# Patient Record
Sex: FEMALE | Race: WHITE | NOT HISPANIC OR LATINO | Employment: OTHER | URBAN - NONMETROPOLITAN AREA
[De-identification: names, ages, dates, MRNs, and addresses within clinical notes are randomized per-mention and may not be internally consistent; named-entity substitution may affect disease eponyms.]

---

## 2017-11-01 ENCOUNTER — TRANSCRIBE ORDERS (OUTPATIENT)
Dept: ADMINISTRATIVE | Facility: HOSPITAL | Age: 72
End: 2017-11-01

## 2017-11-01 DIAGNOSIS — Z12.31 ENCOUNTER FOR SCREENING MAMMOGRAM FOR MALIGNANT NEOPLASM OF BREAST: Primary | ICD-10-CM

## 2017-12-28 ENCOUNTER — HOSPITAL ENCOUNTER (OUTPATIENT)
Dept: MAMMOGRAPHY | Facility: HOSPITAL | Age: 72
Discharge: HOME | End: 2017-12-28
Admitting: INTERNAL MEDICINE

## 2017-12-28 DIAGNOSIS — Z12.31 ENCOUNTER FOR SCREENING MAMMOGRAM FOR MALIGNANT NEOPLASM OF BREAST: ICD-10-CM

## 2017-12-28 PROCEDURE — G0202 SCR MAMMO BI INCL CAD: HCPCS

## 2017-12-28 PROCEDURE — 77063 BREAST TOMOSYNTHESIS BI: CPT

## 2018-11-12 ENCOUNTER — TRANSCRIBE ORDERS (OUTPATIENT)
Dept: ADMINISTRATIVE | Facility: HOSPITAL | Age: 73
End: 2018-11-12

## 2018-11-12 DIAGNOSIS — Z12.39 SCREENING BREAST EXAMINATION: Primary | ICD-10-CM

## 2018-12-31 ENCOUNTER — HOSPITAL ENCOUNTER (OUTPATIENT)
Dept: MAMMOGRAPHY | Facility: HOSPITAL | Age: 73
Discharge: HOME | End: 2018-12-31

## 2018-12-31 ENCOUNTER — TRANSCRIBE ORDERS (OUTPATIENT)
Dept: ADMINISTRATIVE | Facility: HOSPITAL | Age: 73
End: 2018-12-31

## 2018-12-31 ENCOUNTER — HOSPITAL ENCOUNTER (OUTPATIENT)
Dept: MAMMOGRAPHY | Facility: HOSPITAL | Age: 73
Discharge: HOME | End: 2018-12-31
Admitting: PHYSICIAN ASSISTANT

## 2018-12-31 ENCOUNTER — HOSPITAL ENCOUNTER (OUTPATIENT)
Dept: ULTRASOUND IMAGING | Facility: HOSPITAL | Age: 73
Discharge: HOME | End: 2018-12-31

## 2018-12-31 DIAGNOSIS — Z12.39 SCREENING BREAST EXAMINATION: ICD-10-CM

## 2018-12-31 DIAGNOSIS — R92.8 ABNORMAL MAMMOGRAM: ICD-10-CM

## 2018-12-31 DIAGNOSIS — R92.8 ABNORMAL MAMMOGRAM: Primary | ICD-10-CM

## 2018-12-31 PROCEDURE — 77065 DX MAMMO INCL CAD UNI: CPT

## 2018-12-31 PROCEDURE — 77063 BREAST TOMOSYNTHESIS BI: CPT

## 2018-12-31 PROCEDURE — 77067 SCR MAMMO BI INCL CAD: CPT

## 2018-12-31 PROCEDURE — G0279 TOMOSYNTHESIS, MAMMO: HCPCS

## 2018-12-31 PROCEDURE — 76642 ULTRASOUND BREAST LIMITED: CPT

## 2019-10-31 ENCOUNTER — TRANSCRIBE ORDERS (OUTPATIENT)
Dept: ADMINISTRATIVE | Facility: HOSPITAL | Age: 74
End: 2019-10-31

## 2019-10-31 DIAGNOSIS — Z12.39 SCREENING BREAST EXAMINATION: Primary | ICD-10-CM

## 2020-01-06 ENCOUNTER — HOSPITAL ENCOUNTER (OUTPATIENT)
Dept: MAMMOGRAPHY | Facility: HOSPITAL | Age: 75
Discharge: HOME | End: 2020-01-06
Admitting: PHYSICIAN ASSISTANT

## 2020-01-06 PROCEDURE — 77067 SCR MAMMO BI INCL CAD: CPT

## 2020-01-06 PROCEDURE — 77063 BREAST TOMOSYNTHESIS BI: CPT

## 2020-11-24 ENCOUNTER — TRANSCRIBE ORDERS (OUTPATIENT)
Dept: ADMINISTRATIVE | Facility: HOSPITAL | Age: 75
End: 2020-11-24

## 2020-11-24 DIAGNOSIS — Z12.31 ENCOUNTER FOR SCREENING MAMMOGRAM FOR MALIGNANT NEOPLASM OF BREAST: Primary | ICD-10-CM

## 2021-01-01 ENCOUNTER — HOSPITAL ENCOUNTER (OUTPATIENT)
Dept: INFUSION THERAPY | Age: 76
Discharge: HOME | End: 2021-05-20
Payer: MEDICARE

## 2021-01-01 ENCOUNTER — OFFICE VISIT (OUTPATIENT)
Dept: HEMATOLOGY | Age: 76
End: 2021-01-01
Payer: MEDICARE

## 2021-01-01 ENCOUNTER — HOSPITAL ENCOUNTER (OUTPATIENT)
Dept: INFUSION THERAPY | Age: 76
Discharge: HOME | End: 2021-09-03
Payer: MEDICARE

## 2021-01-01 ENCOUNTER — HOSPITAL ENCOUNTER (OUTPATIENT)
Dept: MAMMOGRAPHY | Facility: HOSPITAL | Age: 76
Discharge: HOME | End: 2021-01-08
Admitting: PHYSICIAN ASSISTANT

## 2021-01-01 ENCOUNTER — HOSPITAL ENCOUNTER (OUTPATIENT)
Dept: INFUSION THERAPY | Age: 76
Discharge: HOME | End: 2021-09-22
Payer: MEDICARE

## 2021-01-01 ENCOUNTER — HOSPITAL ENCOUNTER (OUTPATIENT)
Dept: INFUSION THERAPY | Age: 76
Discharge: HOME | End: 2021-05-06
Payer: MEDICARE

## 2021-01-01 ENCOUNTER — HOSPITAL ENCOUNTER (OUTPATIENT)
Dept: CT IMAGING | Age: 76
Discharge: HOME | End: 2021-10-06
Payer: MEDICARE

## 2021-01-01 ENCOUNTER — HOSPITAL ENCOUNTER (OUTPATIENT)
Dept: INFUSION THERAPY | Age: 76
Discharge: HOME | End: 2021-06-09
Payer: MEDICARE

## 2021-01-01 ENCOUNTER — TELEPHONE (OUTPATIENT)
Dept: HEMATOLOGY | Age: 76
End: 2021-01-01

## 2021-01-01 ENCOUNTER — HOSPITAL ENCOUNTER (OUTPATIENT)
Dept: INFUSION THERAPY | Age: 76
Discharge: HOME | End: 2021-11-15
Payer: MEDICARE

## 2021-01-01 ENCOUNTER — HOSPITAL ENCOUNTER (OUTPATIENT)
Dept: INFUSION THERAPY | Age: 76
Discharge: HOME | End: 2021-06-07
Payer: MEDICARE

## 2021-01-01 ENCOUNTER — OFFICE VISIT (OUTPATIENT)
Dept: CARDIOLOGY | Facility: CLINIC | Age: 76
End: 2021-01-01

## 2021-01-01 ENCOUNTER — APPOINTMENT (OUTPATIENT)
Dept: CT IMAGING | Age: 76
End: 2021-01-01
Payer: MEDICARE

## 2021-01-01 ENCOUNTER — HOSPITAL ENCOUNTER (OUTPATIENT)
Dept: INFUSION THERAPY | Age: 76
Discharge: HOME | End: 2021-10-06
Payer: MEDICARE

## 2021-01-01 ENCOUNTER — HOSPITAL ENCOUNTER (OUTPATIENT)
Dept: INFUSION THERAPY | Age: 76
Discharge: HOME | End: 2021-07-07
Payer: MEDICARE

## 2021-01-01 ENCOUNTER — CLINICAL DOCUMENTATION (OUTPATIENT)
Dept: HEMATOLOGY | Age: 76
End: 2021-01-01

## 2021-01-01 ENCOUNTER — HOSPITAL ENCOUNTER (OUTPATIENT)
Dept: INFUSION THERAPY | Age: 76
Discharge: HOME | End: 2021-07-09
Payer: MEDICARE

## 2021-01-01 ENCOUNTER — HOSPITAL ENCOUNTER (OUTPATIENT)
Dept: INFUSION THERAPY | Age: 76
Discharge: HOME | End: 2021-09-24
Payer: MEDICARE

## 2021-01-01 ENCOUNTER — APPOINTMENT (OUTPATIENT)
Dept: INFUSION THERAPY | Age: 76
End: 2021-01-01
Payer: MEDICARE

## 2021-01-01 ENCOUNTER — HOSPITAL ENCOUNTER (OUTPATIENT)
Dept: INFUSION THERAPY | Age: 76
Discharge: HOME | End: 2021-10-12
Payer: MEDICARE

## 2021-01-01 ENCOUNTER — HOSPITAL ENCOUNTER (EMERGENCY)
Age: 76
Discharge: HOME | End: 2021-05-18
Payer: MEDICARE

## 2021-01-01 ENCOUNTER — HOSPITAL ENCOUNTER (OUTPATIENT)
Dept: INFUSION THERAPY | Age: 76
Discharge: HOME | End: 2021-08-06
Payer: MEDICARE

## 2021-01-01 ENCOUNTER — TRANSCRIBE ORDERS (OUTPATIENT)
Dept: ADMINISTRATIVE | Facility: HOSPITAL | Age: 76
End: 2021-01-01

## 2021-01-01 ENCOUNTER — HOSPITAL ENCOUNTER (OUTPATIENT)
Dept: INFUSION THERAPY | Age: 76
Discharge: HOME | End: 2021-08-25
Payer: MEDICARE

## 2021-01-01 ENCOUNTER — HOSPITAL ENCOUNTER (OUTPATIENT)
Dept: INFUSION THERAPY | Age: 76
Discharge: HOME | End: 2021-05-18
Payer: MEDICARE

## 2021-01-01 ENCOUNTER — HOSPITAL ENCOUNTER (OUTPATIENT)
Dept: INFUSION THERAPY | Age: 76
Discharge: HOME | End: 2021-07-28
Payer: MEDICARE

## 2021-01-01 ENCOUNTER — HOSPITAL ENCOUNTER (OUTPATIENT)
Dept: INFUSION THERAPY | Age: 76
Discharge: HOME | End: 2021-05-04
Payer: MEDICARE

## 2021-01-01 ENCOUNTER — APPOINTMENT (OUTPATIENT)
Dept: GENERAL RADIOLOGY | Age: 76
End: 2021-01-01
Payer: MEDICARE

## 2021-01-01 ENCOUNTER — HOSPITAL ENCOUNTER (OUTPATIENT)
Dept: INFUSION THERAPY | Age: 76
Discharge: HOME | End: 2021-08-04
Payer: MEDICARE

## 2021-01-01 ENCOUNTER — TELEPHONE (OUTPATIENT)
Dept: INFUSION THERAPY | Age: 76
End: 2021-01-01

## 2021-01-01 ENCOUNTER — HOSPITAL ENCOUNTER (OUTPATIENT)
Dept: INFUSION THERAPY | Age: 76
Discharge: HOME | End: 2021-09-01
Payer: MEDICARE

## 2021-01-01 ENCOUNTER — TELEPHONE (OUTPATIENT)
Dept: CARDIOLOGY | Facility: CLINIC | Age: 76
End: 2021-01-01

## 2021-01-01 ENCOUNTER — HOSPITAL ENCOUNTER (OUTPATIENT)
Dept: INFUSION THERAPY | Age: 76
Discharge: HOME | End: 2021-06-21
Payer: MEDICARE

## 2021-01-01 ENCOUNTER — HOSPITAL ENCOUNTER (OUTPATIENT)
Dept: INFUSION THERAPY | Age: 76
Discharge: HOME | End: 2021-06-29
Payer: MEDICARE

## 2021-01-01 ENCOUNTER — HOSPITAL ENCOUNTER (OUTPATIENT)
Dept: INFUSION THERAPY | Age: 76
End: 2021-01-01
Payer: MEDICARE

## 2021-01-01 VITALS
TEMPERATURE: 98.3 F | WEIGHT: 162.1 LBS | BODY MASS INDEX: 27.01 KG/M2 | SYSTOLIC BLOOD PRESSURE: 144 MMHG | HEIGHT: 65 IN | DIASTOLIC BLOOD PRESSURE: 81 MMHG | HEART RATE: 52 BPM

## 2021-01-01 VITALS
HEIGHT: 65 IN | TEMPERATURE: 97.5 F | SYSTOLIC BLOOD PRESSURE: 148 MMHG | BODY MASS INDEX: 26.02 KG/M2 | RESPIRATION RATE: 18 BRPM | DIASTOLIC BLOOD PRESSURE: 78 MMHG | OXYGEN SATURATION: 97 % | WEIGHT: 156.2 LBS | HEART RATE: 51 BPM

## 2021-01-01 VITALS
DIASTOLIC BLOOD PRESSURE: 80 MMHG | HEIGHT: 65 IN | WEIGHT: 161 LBS | OXYGEN SATURATION: 100 % | BODY MASS INDEX: 26.82 KG/M2 | HEART RATE: 74 BPM | SYSTOLIC BLOOD PRESSURE: 144 MMHG

## 2021-01-01 VITALS
HEART RATE: 62 BPM | OXYGEN SATURATION: 93 % | SYSTOLIC BLOOD PRESSURE: 136 MMHG | TEMPERATURE: 98.3 F | DIASTOLIC BLOOD PRESSURE: 78 MMHG | RESPIRATION RATE: 11 BRPM

## 2021-01-01 VITALS
WEIGHT: 163.9 LBS | SYSTOLIC BLOOD PRESSURE: 182 MMHG | BODY MASS INDEX: 27.31 KG/M2 | TEMPERATURE: 98 F | HEART RATE: 58 BPM | DIASTOLIC BLOOD PRESSURE: 88 MMHG | OXYGEN SATURATION: 98 % | HEIGHT: 65 IN

## 2021-01-01 VITALS
RESPIRATION RATE: 18 BRPM | SYSTOLIC BLOOD PRESSURE: 156 MMHG | DIASTOLIC BLOOD PRESSURE: 85 MMHG | HEART RATE: 57 BPM | HEIGHT: 65 IN | OXYGEN SATURATION: 98 % | TEMPERATURE: 98.2 F | BODY MASS INDEX: 27.11 KG/M2 | WEIGHT: 162.7 LBS

## 2021-01-01 VITALS
TEMPERATURE: 98 F | HEIGHT: 65 IN | WEIGHT: 158 LBS | BODY MASS INDEX: 26.33 KG/M2 | SYSTOLIC BLOOD PRESSURE: 146 MMHG | HEART RATE: 58 BPM | DIASTOLIC BLOOD PRESSURE: 88 MMHG | OXYGEN SATURATION: 95 %

## 2021-01-01 VITALS
WEIGHT: 155.3 LBS | HEART RATE: 53 BPM | DIASTOLIC BLOOD PRESSURE: 82 MMHG | OXYGEN SATURATION: 98 % | HEIGHT: 65 IN | RESPIRATION RATE: 18 BRPM | TEMPERATURE: 98.6 F | BODY MASS INDEX: 25.87 KG/M2 | SYSTOLIC BLOOD PRESSURE: 163 MMHG

## 2021-01-01 VITALS
BODY MASS INDEX: 26.66 KG/M2 | HEART RATE: 73 BPM | TEMPERATURE: 98.3 F | HEIGHT: 65 IN | OXYGEN SATURATION: 96 % | WEIGHT: 160 LBS | SYSTOLIC BLOOD PRESSURE: 120 MMHG | DIASTOLIC BLOOD PRESSURE: 72 MMHG

## 2021-01-01 VITALS
HEIGHT: 65 IN | OXYGEN SATURATION: 97 % | WEIGHT: 160.9 LBS | DIASTOLIC BLOOD PRESSURE: 85 MMHG | TEMPERATURE: 98.2 F | BODY MASS INDEX: 26.81 KG/M2 | RESPIRATION RATE: 18 BRPM | HEART RATE: 49 BPM | SYSTOLIC BLOOD PRESSURE: 152 MMHG

## 2021-01-01 VITALS
TEMPERATURE: 98.1 F | DIASTOLIC BLOOD PRESSURE: 80 MMHG | HEIGHT: 65 IN | HEART RATE: 51 BPM | WEIGHT: 164.1 LBS | BODY MASS INDEX: 27.34 KG/M2 | OXYGEN SATURATION: 97 % | SYSTOLIC BLOOD PRESSURE: 149 MMHG

## 2021-01-01 VITALS
SYSTOLIC BLOOD PRESSURE: 170 MMHG | WEIGHT: 159.6 LBS | BODY MASS INDEX: 26.59 KG/M2 | DIASTOLIC BLOOD PRESSURE: 92 MMHG | TEMPERATURE: 97.9 F | HEIGHT: 65 IN | HEART RATE: 56 BPM

## 2021-01-01 VITALS
OXYGEN SATURATION: 98 % | TEMPERATURE: 97.7 F | SYSTOLIC BLOOD PRESSURE: 150 MMHG | BODY MASS INDEX: 25.99 KG/M2 | DIASTOLIC BLOOD PRESSURE: 67 MMHG | HEIGHT: 65 IN | HEART RATE: 52 BPM | WEIGHT: 156 LBS

## 2021-01-01 VITALS
HEART RATE: 60 BPM | SYSTOLIC BLOOD PRESSURE: 136 MMHG | RESPIRATION RATE: 18 BRPM | OXYGEN SATURATION: 97 % | DIASTOLIC BLOOD PRESSURE: 82 MMHG

## 2021-01-01 VITALS
HEART RATE: 48 BPM | BODY MASS INDEX: 26.89 KG/M2 | TEMPERATURE: 98.5 F | HEIGHT: 65 IN | WEIGHT: 161.4 LBS | OXYGEN SATURATION: 98 % | DIASTOLIC BLOOD PRESSURE: 88 MMHG | RESPIRATION RATE: 18 BRPM | SYSTOLIC BLOOD PRESSURE: 156 MMHG

## 2021-01-01 VITALS — DIASTOLIC BLOOD PRESSURE: 86 MMHG | SYSTOLIC BLOOD PRESSURE: 142 MMHG

## 2021-01-01 VITALS
DIASTOLIC BLOOD PRESSURE: 75 MMHG | HEART RATE: 46 BPM | TEMPERATURE: 98.2 F | BODY MASS INDEX: 26.51 KG/M2 | RESPIRATION RATE: 18 BRPM | WEIGHT: 159.1 LBS | SYSTOLIC BLOOD PRESSURE: 132 MMHG | HEIGHT: 65 IN

## 2021-01-01 VITALS
HEART RATE: 49 BPM | DIASTOLIC BLOOD PRESSURE: 67 MMHG | OXYGEN SATURATION: 98 % | RESPIRATION RATE: 17 BRPM | BODY MASS INDEX: 25.73 KG/M2 | TEMPERATURE: 98.2 F | WEIGHT: 154.6 LBS | SYSTOLIC BLOOD PRESSURE: 103 MMHG

## 2021-01-01 DIAGNOSIS — C77.9 METASTATIC ADENOCARCINOMA TO LYMPH NODE (HCC): ICD-10-CM

## 2021-01-01 DIAGNOSIS — C18.9 MUCINOUS ADENOCARCINOMA OF COLON (HCC): ICD-10-CM

## 2021-01-01 DIAGNOSIS — C18.7 MALIGNANT NEOPLASM OF SIGMOID COLON (HCC): Primary | ICD-10-CM

## 2021-01-01 DIAGNOSIS — M79.89 PAIN AND SWELLING OF RIGHT LOWER LEG: ICD-10-CM

## 2021-01-01 DIAGNOSIS — I44.1 2ND DEGREE ATRIOVENTRICULAR BLOCK: Primary | ICD-10-CM

## 2021-01-01 DIAGNOSIS — Z51.11 CHEMOTHERAPY MANAGEMENT, ENCOUNTER FOR: ICD-10-CM

## 2021-01-01 DIAGNOSIS — C18.7 MALIGNANT NEOPLASM OF SIGMOID COLON (HCC): ICD-10-CM

## 2021-01-01 DIAGNOSIS — D70.1 CHEMOTHERAPY-INDUCED NEUTROPENIA (HCC): ICD-10-CM

## 2021-01-01 DIAGNOSIS — I44.1 SECOND DEGREE HEART BLOCK: Primary | ICD-10-CM

## 2021-01-01 DIAGNOSIS — T45.1X5A CHEMOTHERAPY-INDUCED PERIPHERAL NEUROPATHY (HCC): ICD-10-CM

## 2021-01-01 DIAGNOSIS — I82.431 ACUTE DEEP VEIN THROMBOSIS (DVT) OF POPLITEAL VEIN OF RIGHT LOWER EXTREMITY (HCC): ICD-10-CM

## 2021-01-01 DIAGNOSIS — Z71.89 CARE PLAN DISCUSSED WITH PATIENT: ICD-10-CM

## 2021-01-01 DIAGNOSIS — T45.1X5D ADVERSE EFFECT OF CHEMOTHERAPY, SUBSEQUENT ENCOUNTER: ICD-10-CM

## 2021-01-01 DIAGNOSIS — Z71.89 COORDINATION OF COMPLEX CARE: ICD-10-CM

## 2021-01-01 DIAGNOSIS — I82.4Y9 ACUTE DEEP VEIN THROMBOSIS (DVT) OF PROXIMAL VEIN OF LOWER EXTREMITY, UNSPECIFIED LATERALITY (HCC): Primary | ICD-10-CM

## 2021-01-01 DIAGNOSIS — T45.1X5A CHEMOTHERAPY-INDUCED NEUTROPENIA (HCC): ICD-10-CM

## 2021-01-01 DIAGNOSIS — Z12.31 ENCOUNTER FOR SCREENING MAMMOGRAM FOR MALIGNANT NEOPLASM OF BREAST: ICD-10-CM

## 2021-01-01 DIAGNOSIS — C18.9 MUCINOUS ADENOCARCINOMA OF COLON (HCC): Primary | ICD-10-CM

## 2021-01-01 DIAGNOSIS — T50.905A MEDICATION REACTION, INITIAL ENCOUNTER: Primary | ICD-10-CM

## 2021-01-01 DIAGNOSIS — R11.0 CHEMOTHERAPY-INDUCED NAUSEA: ICD-10-CM

## 2021-01-01 DIAGNOSIS — G62.0 CHEMOTHERAPY-INDUCED PERIPHERAL NEUROPATHY (HCC): ICD-10-CM

## 2021-01-01 DIAGNOSIS — M79.661 PAIN AND SWELLING OF RIGHT LOWER LEG: ICD-10-CM

## 2021-01-01 DIAGNOSIS — R11.0 CHEMOTHERAPY-INDUCED NAUSEA: Primary | ICD-10-CM

## 2021-01-01 DIAGNOSIS — T45.1X5A CHEMOTHERAPY-INDUCED NAUSEA: ICD-10-CM

## 2021-01-01 DIAGNOSIS — Z12.31 SCREENING MAMMOGRAM FOR BREAST CANCER: Primary | ICD-10-CM

## 2021-01-01 DIAGNOSIS — T45.1X5A CHEMOTHERAPY-INDUCED NAUSEA: Primary | ICD-10-CM

## 2021-01-01 DIAGNOSIS — I10 PRIMARY HYPERTENSION: ICD-10-CM

## 2021-01-01 DIAGNOSIS — E78.2 MIXED HYPERLIPIDEMIA: ICD-10-CM

## 2021-01-01 DIAGNOSIS — Z71.89 CARE PLAN DISCUSSED WITH PATIENT: Primary | ICD-10-CM

## 2021-01-01 LAB
ALBUMIN SERPL-MCNC: 3.6 G/DL (ref 3.5–5.2)
ALBUMIN SERPL-MCNC: 3.6 G/DL (ref 3.5–5.2)
ALBUMIN SERPL-MCNC: 3.7 G/DL (ref 3.5–5.2)
ALBUMIN SERPL-MCNC: 3.8 G/DL (ref 3.5–5.2)
ALBUMIN SERPL-MCNC: 3.8 G/DL (ref 3.5–5.2)
ALBUMIN SERPL-MCNC: 3.9 G/DL (ref 3.5–5.2)
ALBUMIN SERPL-MCNC: 4 G/DL (ref 3.5–5.2)
ALP BLD-CCNC: 115 U/L (ref 35–104)
ALP BLD-CCNC: 117 U/L (ref 35–104)
ALP BLD-CCNC: 119 U/L (ref 35–104)
ALP BLD-CCNC: 119 U/L (ref 35–104)
ALP BLD-CCNC: 121 U/L (ref 35–104)
ALP BLD-CCNC: 132 U/L (ref 35–104)
ALP BLD-CCNC: 133 U/L (ref 35–104)
ALP BLD-CCNC: 133 U/L (ref 35–104)
ALP BLD-CCNC: 135 U/L (ref 35–104)
ALP BLD-CCNC: 149 U/L (ref 35–104)
ALP BLD-CCNC: 178 U/L (ref 35–104)
ALT SERPL-CCNC: 15 U/L (ref 5–33)
ALT SERPL-CCNC: 17 U/L (ref 9–52)
ALT SERPL-CCNC: 18 U/L (ref 9–52)
ALT SERPL-CCNC: 21 U/L (ref 9–52)
ALT SERPL-CCNC: 22 U/L (ref 9–52)
ALT SERPL-CCNC: 23 U/L (ref 9–52)
ALT SERPL-CCNC: 24 U/L (ref 9–52)
ALT SERPL-CCNC: 25 U/L (ref 9–52)
ALT SERPL-CCNC: 27 U/L (ref 9–52)
ALT SERPL-CCNC: 28 U/L (ref 9–52)
ALT SERPL-CCNC: 33 U/L (ref 9–52)
ALT SERPL-CCNC: 53 U/L (ref 9–52)
ALT SERPL-CCNC: 63 U/L (ref 9–52)
ANION GAP SERPL CALCULATED.3IONS-SCNC: 11 MMOL/L (ref 7–19)
ANION GAP SERPL CALCULATED.3IONS-SCNC: 5 MMOL/L (ref 7–19)
ANION GAP SERPL CALCULATED.3IONS-SCNC: 6 MMOL/L (ref 7–19)
ANION GAP SERPL CALCULATED.3IONS-SCNC: 7 MMOL/L (ref 7–19)
ANION GAP SERPL CALCULATED.3IONS-SCNC: 8 MMOL/L (ref 7–19)
ANION GAP SERPL CALCULATED.3IONS-SCNC: 9 MMOL/L (ref 7–19)
AST SERPL-CCNC: 19 U/L (ref 5–32)
AST SERPL-CCNC: 25 U/L (ref 14–36)
AST SERPL-CCNC: 27 U/L (ref 14–36)
AST SERPL-CCNC: 33 U/L (ref 14–36)
AST SERPL-CCNC: 33 U/L (ref 14–36)
AST SERPL-CCNC: 35 U/L (ref 14–36)
AST SERPL-CCNC: 36 U/L (ref 14–36)
AST SERPL-CCNC: 38 U/L (ref 14–36)
AST SERPL-CCNC: 38 U/L (ref 14–36)
AST SERPL-CCNC: 43 U/L (ref 14–36)
AST SERPL-CCNC: 45 U/L (ref 14–36)
AST SERPL-CCNC: 47 U/L (ref 14–36)
AST SERPL-CCNC: 65 U/L (ref 14–36)
BASOPHILS ABSOLUTE: 0 K/UL (ref 0–0.2)
BASOPHILS ABSOLUTE: 0.01 K/UL (ref 0.01–0.08)
BASOPHILS ABSOLUTE: 0.02 K/UL (ref 0.01–0.08)
BASOPHILS ABSOLUTE: 0.03 K/UL (ref 0.01–0.08)
BASOPHILS ABSOLUTE: 0.04 K/UL (ref 0.01–0.08)
BASOPHILS ABSOLUTE: 0.04 K/UL (ref 0.01–0.08)
BASOPHILS RELATIVE PERCENT: 0 % (ref 0–1)
BASOPHILS RELATIVE PERCENT: 0.3 % (ref 0.1–1.2)
BASOPHILS RELATIVE PERCENT: 0.4 % (ref 0.1–1.2)
BASOPHILS RELATIVE PERCENT: 0.6 % (ref 0.1–1.2)
BASOPHILS RELATIVE PERCENT: 0.8 % (ref 0.1–1.2)
BASOPHILS RELATIVE PERCENT: 0.9 % (ref 0.1–1.2)
BASOPHILS RELATIVE PERCENT: 1 % (ref 0.1–1.2)
BILIRUB SERPL-MCNC: 0.3 MG/DL (ref 0.2–1.2)
BILIRUB SERPL-MCNC: 0.4 MG/DL (ref 0.2–1.3)
BILIRUB SERPL-MCNC: 0.5 MG/DL (ref 0.2–1.3)
BILIRUB SERPL-MCNC: 0.5 MG/DL (ref 0.2–1.3)
BILIRUB SERPL-MCNC: 0.6 MG/DL (ref 0.2–1.3)
BILIRUB SERPL-MCNC: 0.6 MG/DL (ref 0.2–1.3)
BILIRUB SERPL-MCNC: 0.7 MG/DL (ref 0.2–1.3)
BILIRUB SERPL-MCNC: 0.8 MG/DL (ref 0.2–1.3)
BILIRUBIN URINE: NEGATIVE
BLOOD, URINE: NEGATIVE
BUN BLDV-MCNC: 14 MG/DL (ref 8–23)
BUN BLDV-MCNC: 15 MG/DL (ref 7–17)
BUN BLDV-MCNC: 16 MG/DL (ref 7–17)
BUN BLDV-MCNC: 16 MG/DL (ref 7–17)
BUN BLDV-MCNC: 18 MG/DL (ref 7–17)
BUN BLDV-MCNC: 19 MG/DL (ref 7–17)
BUN BLDV-MCNC: 19 MG/DL (ref 7–17)
BUN BLDV-MCNC: 20 MG/DL (ref 7–17)
BUN BLDV-MCNC: 20 MG/DL (ref 7–17)
BUN BLDV-MCNC: 21 MG/DL (ref 7–17)
BUN BLDV-MCNC: 28 MG/DL (ref 7–17)
CALCIUM SERPL-MCNC: 8.4 MG/DL (ref 8.4–10.2)
CALCIUM SERPL-MCNC: 8.6 MG/DL (ref 8.4–10.2)
CALCIUM SERPL-MCNC: 8.7 MG/DL (ref 8.4–10.2)
CALCIUM SERPL-MCNC: 8.7 MG/DL (ref 8.4–10.2)
CALCIUM SERPL-MCNC: 8.8 MG/DL (ref 8.4–10.2)
CALCIUM SERPL-MCNC: 9 MG/DL (ref 8.4–10.2)
CALCIUM SERPL-MCNC: 9.1 MG/DL (ref 8.4–10.2)
CALCIUM SERPL-MCNC: 9.1 MG/DL (ref 8.4–10.2)
CALCIUM SERPL-MCNC: 9.2 MG/DL (ref 8.4–10.2)
CALCIUM SERPL-MCNC: 9.3 MG/DL (ref 8.4–10.2)
CALCIUM SERPL-MCNC: 9.3 MG/DL (ref 8.4–10.2)
CALCIUM SERPL-MCNC: 9.4 MG/DL (ref 8.4–10.2)
CALCIUM SERPL-MCNC: 9.5 MG/DL (ref 8.8–10.2)
CEA: 1.9 NG/ML (ref 0–3)
CEA: 2.5 NG/ML (ref 0–3)
CEA: 3.4 NG/ML (ref 0–3)
CHLORIDE BLD-SCNC: 101 MMOL/L (ref 98–111)
CHLORIDE BLD-SCNC: 104 MMOL/L (ref 98–111)
CHLORIDE BLD-SCNC: 105 MMOL/L (ref 98–111)
CHLORIDE BLD-SCNC: 106 MMOL/L (ref 98–111)
CHLORIDE BLD-SCNC: 107 MMOL/L (ref 98–111)
CHLORIDE BLD-SCNC: 107 MMOL/L (ref 98–111)
CLARITY: CLEAR
CO2: 25 MMOL/L (ref 22–29)
CO2: 27 MMOL/L (ref 22–29)
CO2: 27 MMOL/L (ref 22–29)
CO2: 28 MMOL/L (ref 22–29)
CO2: 28 MMOL/L (ref 22–29)
CO2: 29 MMOL/L (ref 22–29)
CO2: 30 MMOL/L (ref 22–29)
CO2: 30 MMOL/L (ref 22–29)
COLOR: YELLOW
CREAT SERPL-MCNC: 0.7 MG/DL (ref 0.5–0.9)
CREAT SERPL-MCNC: 0.7 MG/DL (ref 0.5–1)
CREAT SERPL-MCNC: 0.8 MG/DL (ref 0.5–1)
CREAT SERPL-MCNC: 1 MG/DL (ref 0.5–1)
EKG P AXIS: 68 DEGREES
EKG P-R INTERVAL: 178 MS
EKG Q-T INTERVAL: 396 MS
EKG QRS DURATION: 90 MS
EKG QTC CALCULATION (BAZETT): 403 MS
EKG T AXIS: 39 DEGREES
EOSINOPHILS ABSOLUTE: 0 K/UL (ref 0–0.6)
EOSINOPHILS ABSOLUTE: 0.06 K/UL (ref 0.04–0.54)
EOSINOPHILS ABSOLUTE: 0.07 K/UL (ref 0.04–0.54)
EOSINOPHILS ABSOLUTE: 0.08 K/UL (ref 0.04–0.54)
EOSINOPHILS ABSOLUTE: 0.09 K/UL (ref 0.04–0.54)
EOSINOPHILS ABSOLUTE: 0.1 K/UL (ref 0.04–0.54)
EOSINOPHILS ABSOLUTE: 0.14 K/UL (ref 0.04–0.54)
EOSINOPHILS ABSOLUTE: 0.19 K/UL (ref 0.04–0.54)
EOSINOPHILS ABSOLUTE: 0.23 K/UL (ref 0.04–0.54)
EOSINOPHILS ABSOLUTE: 0.24 K/UL (ref 0.04–0.54)
EOSINOPHILS ABSOLUTE: 0.35 K/UL (ref 0.04–0.54)
EOSINOPHILS RELATIVE PERCENT: 0.9 % (ref 0.7–7)
EOSINOPHILS RELATIVE PERCENT: 1 % (ref 0.7–7)
EOSINOPHILS RELATIVE PERCENT: 1 % (ref 0–5)
EOSINOPHILS RELATIVE PERCENT: 1.3 % (ref 0.7–7)
EOSINOPHILS RELATIVE PERCENT: 1.6 % (ref 0.7–7)
EOSINOPHILS RELATIVE PERCENT: 2 % (ref 0.7–7)
EOSINOPHILS RELATIVE PERCENT: 2.2 % (ref 0.7–7)
EOSINOPHILS RELATIVE PERCENT: 2.5 % (ref 0.7–7)
EOSINOPHILS RELATIVE PERCENT: 2.6 % (ref 0.7–7)
EOSINOPHILS RELATIVE PERCENT: 3.7 % (ref 0.7–7)
EOSINOPHILS RELATIVE PERCENT: 4.1 % (ref 0.7–7)
EOSINOPHILS RELATIVE PERCENT: 4.7 % (ref 0.7–7)
EOSINOPHILS RELATIVE PERCENT: 8.9 % (ref 0.7–7)
GFR AFRICAN AMERICAN: >59
GFR NON-AFRICAN AMERICAN: 54
GFR NON-AFRICAN AMERICAN: >60
GLOBULIN: 2.5 G/DL
GLOBULIN: 2.6 G/DL
GLOBULIN: 2.7 G/DL
GLOBULIN: 2.8 G/DL
GLOBULIN: 2.9 G/DL
GLOBULIN: 2.9 G/DL
GLOBULIN: 3.1 G/DL
GLUCOSE BLD-MCNC: 101 MG/DL (ref 74–106)
GLUCOSE BLD-MCNC: 102 MG/DL (ref 74–106)
GLUCOSE BLD-MCNC: 103 MG/DL (ref 74–109)
GLUCOSE BLD-MCNC: 68 MG/DL (ref 74–106)
GLUCOSE BLD-MCNC: 78 MG/DL (ref 74–106)
GLUCOSE BLD-MCNC: 81 MG/DL (ref 74–106)
GLUCOSE BLD-MCNC: 84 MG/DL (ref 74–106)
GLUCOSE BLD-MCNC: 94 MG/DL (ref 74–106)
GLUCOSE BLD-MCNC: 96 MG/DL (ref 74–106)
GLUCOSE BLD-MCNC: 96 MG/DL (ref 74–106)
GLUCOSE BLD-MCNC: 97 MG/DL (ref 74–106)
GLUCOSE BLD-MCNC: 98 MG/DL (ref 74–106)
GLUCOSE BLD-MCNC: 98 MG/DL (ref 74–106)
GLUCOSE URINE: NEGATIVE MG/DL
HCT VFR BLD CALC: 33.7 % (ref 34.1–44.9)
HCT VFR BLD CALC: 35.1 % (ref 34.1–44.9)
HCT VFR BLD CALC: 35.5 % (ref 34.1–44.9)
HCT VFR BLD CALC: 35.8 % (ref 34.1–44.9)
HCT VFR BLD CALC: 36.1 % (ref 34.1–44.9)
HCT VFR BLD CALC: 36.3 % (ref 34.1–44.9)
HCT VFR BLD CALC: 36.3 % (ref 34.1–44.9)
HCT VFR BLD CALC: 36.4 % (ref 34.1–44.9)
HCT VFR BLD CALC: 36.6 % (ref 34.1–44.9)
HCT VFR BLD CALC: 37 % (ref 34.1–44.9)
HCT VFR BLD CALC: 37.2 % (ref 34.1–44.9)
HCT VFR BLD CALC: 38.3 % (ref 34.1–44.9)
HCT VFR BLD CALC: 39.4 % (ref 37–47)
HEMOGLOBIN: 11.4 G/DL (ref 11.2–15.7)
HEMOGLOBIN: 11.7 G/DL (ref 11.2–15.7)
HEMOGLOBIN: 11.9 G/DL (ref 11.2–15.7)
HEMOGLOBIN: 11.9 G/DL (ref 11.2–15.7)
HEMOGLOBIN: 12 G/DL (ref 11.2–15.7)
HEMOGLOBIN: 12.1 G/DL (ref 11.2–15.7)
HEMOGLOBIN: 12.3 G/DL (ref 11.2–15.7)
HEMOGLOBIN: 12.5 G/DL (ref 11.2–15.7)
HEMOGLOBIN: 13.4 G/DL (ref 12–16)
IMMATURE GRANULOCYTES #: 0 K/UL
KETONES, URINE: NEGATIVE MG/DL
LEUKOCYTE ESTERASE, URINE: NEGATIVE
LYMPHOCYTES ABSOLUTE: 1 K/UL (ref 1.1–4.5)
LYMPHOCYTES ABSOLUTE: 1.26 K/UL (ref 1.18–3.74)
LYMPHOCYTES ABSOLUTE: 1.44 K/UL (ref 1.18–3.74)
LYMPHOCYTES ABSOLUTE: 1.51 K/UL (ref 1.18–3.74)
LYMPHOCYTES ABSOLUTE: 1.58 K/UL (ref 1.18–3.74)
LYMPHOCYTES ABSOLUTE: 1.6 K/UL (ref 1.18–3.74)
LYMPHOCYTES ABSOLUTE: 1.62 K/UL (ref 1.18–3.74)
LYMPHOCYTES ABSOLUTE: 1.64 K/UL (ref 1.18–3.74)
LYMPHOCYTES ABSOLUTE: 1.78 K/UL (ref 1.18–3.74)
LYMPHOCYTES ABSOLUTE: 1.86 K/UL (ref 1.18–3.74)
LYMPHOCYTES ABSOLUTE: 1.94 K/UL (ref 1.18–3.74)
LYMPHOCYTES ABSOLUTE: 1.99 K/UL (ref 1.18–3.74)
LYMPHOCYTES ABSOLUTE: 2.15 K/UL (ref 1.18–3.74)
LYMPHOCYTES RELATIVE PERCENT: 17.7 % (ref 19.3–53.1)
LYMPHOCYTES RELATIVE PERCENT: 19.8 % (ref 19.3–53.1)
LYMPHOCYTES RELATIVE PERCENT: 22 % (ref 19.3–53.1)
LYMPHOCYTES RELATIVE PERCENT: 23.6 % (ref 19.3–53.1)
LYMPHOCYTES RELATIVE PERCENT: 24.6 % (ref 20–40)
LYMPHOCYTES RELATIVE PERCENT: 30.4 % (ref 19.3–53.1)
LYMPHOCYTES RELATIVE PERCENT: 31 % (ref 19.3–53.1)
LYMPHOCYTES RELATIVE PERCENT: 31.9 % (ref 19.3–53.1)
LYMPHOCYTES RELATIVE PERCENT: 42.8 % (ref 19.3–53.1)
LYMPHOCYTES RELATIVE PERCENT: 46.7 % (ref 19.3–53.1)
LYMPHOCYTES RELATIVE PERCENT: 48.4 % (ref 19.3–53.1)
LYMPHOCYTES RELATIVE PERCENT: 49.1 % (ref 19.3–53.1)
LYMPHOCYTES RELATIVE PERCENT: 51.7 % (ref 19.3–53.1)
MCH RBC QN AUTO: 31.3 PG (ref 25.6–32.2)
MCH RBC QN AUTO: 31.3 PG (ref 25.6–32.2)
MCH RBC QN AUTO: 31.6 PG (ref 25.6–32.2)
MCH RBC QN AUTO: 31.6 PG (ref 25.6–32.2)
MCH RBC QN AUTO: 31.8 PG (ref 25.6–32.2)
MCH RBC QN AUTO: 31.9 PG (ref 25.6–32.2)
MCH RBC QN AUTO: 31.9 PG (ref 27–31)
MCH RBC QN AUTO: 32 PG (ref 25.6–32.2)
MCH RBC QN AUTO: 32.2 PG (ref 25.6–32.2)
MCH RBC QN AUTO: 32.4 PG (ref 25.6–32.2)
MCH RBC QN AUTO: 32.7 PG (ref 25.6–32.2)
MCH RBC QN AUTO: 33.1 PG (ref 25.6–32.2)
MCH RBC QN AUTO: 33.5 PG (ref 25.6–32.2)
MCHC RBC AUTO-ENTMCNC: 32.6 G/DL (ref 32.3–35.5)
MCHC RBC AUTO-ENTMCNC: 33.1 G/DL (ref 32.3–35.5)
MCHC RBC AUTO-ENTMCNC: 33.2 G/DL (ref 32.3–35.5)
MCHC RBC AUTO-ENTMCNC: 33.3 G/DL (ref 32.3–35.5)
MCHC RBC AUTO-ENTMCNC: 33.3 G/DL (ref 32.3–35.5)
MCHC RBC AUTO-ENTMCNC: 33.5 G/DL (ref 32.3–35.5)
MCHC RBC AUTO-ENTMCNC: 33.5 G/DL (ref 32.3–35.5)
MCHC RBC AUTO-ENTMCNC: 33.6 G/DL (ref 32.3–35.5)
MCHC RBC AUTO-ENTMCNC: 33.8 G/DL (ref 32.3–35.5)
MCHC RBC AUTO-ENTMCNC: 34 G/DL (ref 33–37)
MCHC RBC AUTO-ENTMCNC: 34.2 G/DL (ref 32.3–35.5)
MCV RBC AUTO: 100 FL (ref 79.4–94.8)
MCV RBC AUTO: 93.4 FL (ref 79.4–94.8)
MCV RBC AUTO: 93.6 FL (ref 79.4–94.8)
MCV RBC AUTO: 93.8 FL (ref 81–99)
MCV RBC AUTO: 94.8 FL (ref 79.4–94.8)
MCV RBC AUTO: 95 FL (ref 79.4–94.8)
MCV RBC AUTO: 95 FL (ref 79.4–94.8)
MCV RBC AUTO: 95.8 FL (ref 79.4–94.8)
MCV RBC AUTO: 95.9 FL (ref 79.4–94.8)
MCV RBC AUTO: 96.8 FL (ref 79.4–94.8)
MCV RBC AUTO: 97.3 FL (ref 79.4–94.8)
MCV RBC AUTO: 97.4 FL (ref 79.4–94.8)
MCV RBC AUTO: 99.4 FL (ref 79.4–94.8)
MONOCYTES ABSOLUTE: 0.2 K/UL (ref 0–0.9)
MONOCYTES ABSOLUTE: 0.37 K/UL (ref 0.24–0.82)
MONOCYTES ABSOLUTE: 0.51 K/UL (ref 0.24–0.82)
MONOCYTES ABSOLUTE: 0.55 K/UL (ref 0.24–0.82)
MONOCYTES ABSOLUTE: 0.71 K/UL (ref 0.24–0.82)
MONOCYTES ABSOLUTE: 0.76 K/UL (ref 0.24–0.82)
MONOCYTES ABSOLUTE: 0.77 K/UL (ref 0.24–0.82)
MONOCYTES ABSOLUTE: 0.78 K/UL (ref 0.24–0.82)
MONOCYTES ABSOLUTE: 0.81 K/UL (ref 0.24–0.82)
MONOCYTES ABSOLUTE: 0.86 K/UL (ref 0.24–0.82)
MONOCYTES ABSOLUTE: 0.87 K/UL (ref 0.24–0.82)
MONOCYTES ABSOLUTE: 0.9 K/UL (ref 0.24–0.82)
MONOCYTES ABSOLUTE: 0.91 K/UL (ref 0.24–0.82)
MONOCYTES RELATIVE PERCENT: 10.1 % (ref 4.7–12.5)
MONOCYTES RELATIVE PERCENT: 10.2 % (ref 4.7–12.5)
MONOCYTES RELATIVE PERCENT: 11 % (ref 4.7–12.5)
MONOCYTES RELATIVE PERCENT: 11.4 % (ref 4.7–12.5)
MONOCYTES RELATIVE PERCENT: 11.9 % (ref 4.7–12.5)
MONOCYTES RELATIVE PERCENT: 13.7 % (ref 4.7–12.5)
MONOCYTES RELATIVE PERCENT: 20.9 % (ref 4.7–12.5)
MONOCYTES RELATIVE PERCENT: 24.2 % (ref 4.7–12.5)
MONOCYTES RELATIVE PERCENT: 25.2 % (ref 4.7–12.5)
MONOCYTES RELATIVE PERCENT: 28.8 % (ref 4.7–12.5)
MONOCYTES RELATIVE PERCENT: 4.2 % (ref 0–10)
MONOCYTES RELATIVE PERCENT: 7.5 % (ref 4.7–12.5)
MONOCYTES RELATIVE PERCENT: 7.7 % (ref 4.7–12.5)
NEUTROPHILS ABSOLUTE: 0.6 K/UL (ref 1.56–6.13)
NEUTROPHILS ABSOLUTE: 0.73 K/UL (ref 1.56–6.13)
NEUTROPHILS ABSOLUTE: 0.76 K/UL (ref 1.56–6.13)
NEUTROPHILS ABSOLUTE: 0.82 K/UL (ref 1.56–6.13)
NEUTROPHILS ABSOLUTE: 1.19 K/UL (ref 1.56–6.13)
NEUTROPHILS ABSOLUTE: 2.49 K/UL (ref 1.56–6.13)
NEUTROPHILS ABSOLUTE: 2.8 K/UL (ref 1.5–7.5)
NEUTROPHILS ABSOLUTE: 3.56 K/UL (ref 1.56–6.13)
NEUTROPHILS ABSOLUTE: 3.74 K/UL (ref 1.56–6.13)
NEUTROPHILS ABSOLUTE: 4.5 K/UL (ref 1.56–6.13)
NEUTROPHILS ABSOLUTE: 4.83 K/UL (ref 1.56–6.13)
NEUTROPHILS ABSOLUTE: 6.19 K/UL (ref 1.56–6.13)
NEUTROPHILS ABSOLUTE: 8.11 K/UL (ref 1.56–6.13)
NEUTROPHILS RELATIVE PERCENT: 19.2 % (ref 34–71.1)
NEUTROPHILS RELATIVE PERCENT: 21 % (ref 34–71.1)
NEUTROPHILS RELATIVE PERCENT: 22.6 % (ref 34–71.1)
NEUTROPHILS RELATIVE PERCENT: 30.3 % (ref 34–71.1)
NEUTROPHILS RELATIVE PERCENT: 31.8 % (ref 34–71.1)
NEUTROPHILS RELATIVE PERCENT: 53.5 % (ref 34–71.1)
NEUTROPHILS RELATIVE PERCENT: 55.5 % (ref 34–71.1)
NEUTROPHILS RELATIVE PERCENT: 55.8 % (ref 34–71.1)
NEUTROPHILS RELATIVE PERCENT: 64.6 % (ref 34–71.1)
NEUTROPHILS RELATIVE PERCENT: 65.5 % (ref 34–71.1)
NEUTROPHILS RELATIVE PERCENT: 68.7 % (ref 34–71.1)
NEUTROPHILS RELATIVE PERCENT: 70.2 % (ref 50–65)
NEUTROPHILS RELATIVE PERCENT: 72.2 % (ref 34–71.1)
NITRITE, URINE: NEGATIVE
PDW BLD-RTO: 13.5 % (ref 11.7–14.4)
PDW BLD-RTO: 13.7 % (ref 11.5–14.5)
PDW BLD-RTO: 14.1 % (ref 11.7–14.4)
PDW BLD-RTO: 14.1 % (ref 11.7–14.4)
PDW BLD-RTO: 14.7 % (ref 11.7–14.4)
PDW BLD-RTO: 15 % (ref 11.7–14.4)
PDW BLD-RTO: 15.2 % (ref 11.7–14.4)
PDW BLD-RTO: 15.2 % (ref 11.7–14.4)
PDW BLD-RTO: 15.3 % (ref 11.7–14.4)
PDW BLD-RTO: 15.6 % (ref 11.7–14.4)
PDW BLD-RTO: 15.6 % (ref 11.7–14.4)
PDW BLD-RTO: 15.9 % (ref 11.7–14.4)
PDW BLD-RTO: 16.1 % (ref 11.7–14.4)
PH UA: 6 (ref 5–8)
PLATELET # BLD: 102 K/UL (ref 182–369)
PLATELET # BLD: 148 K/UL (ref 182–369)
PLATELET # BLD: 175 K/UL (ref 182–369)
PLATELET # BLD: 176 K/UL (ref 130–400)
PLATELET # BLD: 197 K/UL (ref 182–369)
PLATELET # BLD: 203 K/UL (ref 182–369)
PLATELET # BLD: 210 K/UL (ref 182–369)
PLATELET # BLD: 228 K/UL (ref 182–369)
PLATELET # BLD: 237 K/UL (ref 182–369)
PLATELET # BLD: 239 K/UL (ref 182–369)
PLATELET # BLD: 243 K/UL (ref 182–369)
PLATELET # BLD: 257 K/UL (ref 182–369)
PLATELET # BLD: 259 K/UL (ref 182–369)
PMV BLD AUTO: 10.2 FL (ref 7.4–10.4)
PMV BLD AUTO: 10.3 FL (ref 7.4–10.4)
PMV BLD AUTO: 10.3 FL (ref 9.4–12.3)
PMV BLD AUTO: 10.4 FL (ref 7.4–10.4)
PMV BLD AUTO: 10.5 FL (ref 7.4–10.4)
PMV BLD AUTO: 10.7 FL (ref 7.4–10.4)
PMV BLD AUTO: 9.4 FL (ref 7.4–10.4)
PMV BLD AUTO: 9.4 FL (ref 7.4–10.4)
PMV BLD AUTO: 9.6 FL (ref 7.4–10.4)
PMV BLD AUTO: 9.6 FL (ref 7.4–10.4)
PMV BLD AUTO: 9.7 FL (ref 7.4–10.4)
PMV BLD AUTO: 9.8 FL (ref 7.4–10.4)
PMV BLD AUTO: 9.8 FL (ref 7.4–10.4)
POTASSIUM REFLEX MAGNESIUM: 3.6 MMOL/L (ref 3.5–5)
POTASSIUM SERPL-SCNC: 4 MMOL/L (ref 3.5–5.1)
POTASSIUM SERPL-SCNC: 4.3 MMOL/L (ref 3.5–5.1)
POTASSIUM SERPL-SCNC: 4.4 MMOL/L (ref 3.5–5.1)
POTASSIUM SERPL-SCNC: 4.5 MMOL/L (ref 3.5–5.1)
POTASSIUM SERPL-SCNC: 4.5 MMOL/L (ref 3.5–5.1)
POTASSIUM SERPL-SCNC: 4.8 MMOL/L (ref 3.5–5.1)
PROTEIN UA: NEGATIVE
PROTEIN UA: NEGATIVE MG/DL
RBC # BLD: 3.53 M/UL (ref 3.93–5.22)
RBC # BLD: 3.55 M/UL (ref 3.93–5.22)
RBC # BLD: 3.61 M/UL (ref 3.93–5.22)
RBC # BLD: 3.7 M/UL (ref 3.93–5.22)
RBC # BLD: 3.73 M/UL (ref 3.93–5.22)
RBC # BLD: 3.8 M/UL (ref 3.93–5.22)
RBC # BLD: 3.82 M/UL (ref 3.93–5.22)
RBC # BLD: 3.83 M/UL (ref 3.93–5.22)
RBC # BLD: 3.83 M/UL (ref 3.93–5.22)
RBC # BLD: 3.86 M/UL (ref 3.93–5.22)
RBC # BLD: 3.91 M/UL (ref 3.93–5.22)
RBC # BLD: 4 M/UL (ref 3.93–5.22)
RBC # BLD: 4.2 M/UL (ref 4.2–5.4)
SODIUM BLD-SCNC: 137 MMOL/L (ref 137–145)
SODIUM BLD-SCNC: 138 MMOL/L (ref 136–145)
SODIUM BLD-SCNC: 138 MMOL/L (ref 137–145)
SODIUM BLD-SCNC: 139 MMOL/L (ref 137–145)
SODIUM BLD-SCNC: 140 MMOL/L (ref 137–145)
SODIUM BLD-SCNC: 141 MMOL/L (ref 137–145)
SODIUM BLD-SCNC: 141 MMOL/L (ref 137–145)
SODIUM BLD-SCNC: 142 MMOL/L (ref 137–145)
SODIUM BLD-SCNC: 144 MMOL/L (ref 137–145)
SPECIFIC GRAVITY UA: 1.01 (ref 1–1.03)
TOTAL PROTEIN: 6.2 G/DL (ref 6.3–8.2)
TOTAL PROTEIN: 6.3 G/DL (ref 6.3–8.2)
TOTAL PROTEIN: 6.3 G/DL (ref 6.3–8.2)
TOTAL PROTEIN: 6.4 G/DL (ref 6.3–8.2)
TOTAL PROTEIN: 6.5 G/DL (ref 6.3–8.2)
TOTAL PROTEIN: 6.7 G/DL (ref 6.3–8.2)
TOTAL PROTEIN: 6.8 G/DL (ref 6.3–8.2)
TOTAL PROTEIN: 6.9 G/DL (ref 6.3–8.2)
TOTAL PROTEIN: 7 G/DL (ref 6.6–8.7)
UROBILINOGEN, URINE: 0.2 E.U./DL
WBC # BLD: 11.23 K/UL (ref 3.98–10.04)
WBC # BLD: 2.7 K/UL (ref 3.98–10.04)
WBC # BLD: 3.12 K/UL (ref 3.98–10.04)
WBC # BLD: 3.22 K/UL (ref 3.98–10.04)
WBC # BLD: 3.6 K/UL (ref 3.98–10.04)
WBC # BLD: 3.74 K/UL (ref 3.98–10.04)
WBC # BLD: 4 K/UL (ref 4.8–10.8)
WBC # BLD: 4.65 K/UL (ref 3.98–10.04)
WBC # BLD: 6.38 K/UL (ref 3.98–10.04)
WBC # BLD: 6.74 K/UL (ref 3.98–10.04)
WBC # BLD: 6.96 K/UL (ref 3.98–10.04)
WBC # BLD: 7.37 K/UL (ref 3.98–10.04)
WBC # BLD: 9.01 K/UL (ref 3.98–10.04)

## 2021-01-01 PROCEDURE — 96375 TX/PRO/DX INJ NEW DRUG ADDON: CPT

## 2021-01-01 PROCEDURE — 96413 CHEMO IV INFUSION 1 HR: CPT

## 2021-01-01 PROCEDURE — 96523 IRRIG DRUG DELIVERY DEVICE: CPT

## 2021-01-01 PROCEDURE — 6360000002 HC RX W HCPCS: Performed by: INTERNAL MEDICINE

## 2021-01-01 PROCEDURE — 96417 CHEMO IV INFUS EACH ADDL SEQ: CPT

## 2021-01-01 PROCEDURE — G8484 FLU IMMUNIZE NO ADMIN: HCPCS | Performed by: INTERNAL MEDICINE

## 2021-01-01 PROCEDURE — G8428 CUR MEDS NOT DOCUMENT: HCPCS | Performed by: INTERNAL MEDICINE

## 2021-01-01 PROCEDURE — 99215 OFFICE O/P EST HI 40 MIN: CPT | Performed by: INTERNAL MEDICINE

## 2021-01-01 PROCEDURE — 6370000000 HC RX 637 (ALT 250 FOR IP): Performed by: INTERNAL MEDICINE

## 2021-01-01 PROCEDURE — 96368 THER/DIAG CONCURRENT INF: CPT

## 2021-01-01 PROCEDURE — 1090F PRES/ABSN URINE INCON ASSESS: CPT | Performed by: INTERNAL MEDICINE

## 2021-01-01 PROCEDURE — 85025 COMPLETE CBC W/AUTO DIFF WBC: CPT

## 2021-01-01 PROCEDURE — G8427 DOCREV CUR MEDS BY ELIG CLIN: HCPCS | Performed by: INTERNAL MEDICINE

## 2021-01-01 PROCEDURE — 82378 CARCINOEMBRYONIC ANTIGEN: CPT

## 2021-01-01 PROCEDURE — 36415 COLL VENOUS BLD VENIPUNCTURE: CPT

## 2021-01-01 PROCEDURE — 99214 OFFICE O/P EST MOD 30 MIN: CPT | Performed by: INTERNAL MEDICINE

## 2021-01-01 PROCEDURE — G0498 CHEMO EXTEND IV INFUS W/PUMP: HCPCS

## 2021-01-01 PROCEDURE — 4040F PNEUMOC VAC/ADMIN/RCVD: CPT | Performed by: INTERNAL MEDICINE

## 2021-01-01 PROCEDURE — 80053 COMPREHEN METABOLIC PANEL: CPT

## 2021-01-01 PROCEDURE — 96411 CHEMO IV PUSH ADDL DRUG: CPT

## 2021-01-01 PROCEDURE — 1123F ACP DISCUSS/DSCN MKR DOCD: CPT | Performed by: INTERNAL MEDICINE

## 2021-01-01 PROCEDURE — 96415 CHEMO IV INFUSION ADDL HR: CPT

## 2021-01-01 PROCEDURE — 96374 THER/PROPH/DIAG INJ IV PUSH: CPT

## 2021-01-01 PROCEDURE — G8400 PT W/DXA NO RESULTS DOC: HCPCS | Performed by: INTERNAL MEDICINE

## 2021-01-01 PROCEDURE — G8417 CALC BMI ABV UP PARAM F/U: HCPCS | Performed by: INTERNAL MEDICINE

## 2021-01-01 PROCEDURE — 3017F COLORECTAL CA SCREEN DOC REV: CPT | Performed by: INTERNAL MEDICINE

## 2021-01-01 PROCEDURE — 1036F TOBACCO NON-USER: CPT | Performed by: INTERNAL MEDICINE

## 2021-01-01 PROCEDURE — 93005 ELECTROCARDIOGRAM TRACING: CPT | Performed by: PHYSICIAN ASSISTANT

## 2021-01-01 PROCEDURE — 6360000002 HC RX W HCPCS

## 2021-01-01 PROCEDURE — 2580000003 HC RX 258: Performed by: INTERNAL MEDICINE

## 2021-01-01 PROCEDURE — 6360000002 HC RX W HCPCS: Performed by: PHYSICIAN ASSISTANT

## 2021-01-01 PROCEDURE — 81003 URINALYSIS AUTO W/O SCOPE: CPT

## 2021-01-01 PROCEDURE — 77063 BREAST TOMOSYNTHESIS BI: CPT

## 2021-01-01 PROCEDURE — 96367 TX/PROPH/DG ADDL SEQ IV INF: CPT

## 2021-01-01 PROCEDURE — 71260 CT THORAX DX C+: CPT

## 2021-01-01 PROCEDURE — 96377 APPLICATON ON-BODY INJECTOR: CPT

## 2021-01-01 PROCEDURE — 96409 CHEMO IV PUSH SNGL DRUG: CPT

## 2021-01-01 PROCEDURE — 93000 ELECTROCARDIOGRAM COMPLETE: CPT | Performed by: INTERNAL MEDICINE

## 2021-01-01 PROCEDURE — 99204 OFFICE O/P NEW MOD 45 MIN: CPT | Performed by: INTERNAL MEDICINE

## 2021-01-01 PROCEDURE — 96401 CHEMO ANTI-NEOPL SQ/IM: CPT

## 2021-01-01 PROCEDURE — 99205 OFFICE O/P NEW HI 60 MIN: CPT | Performed by: INTERNAL MEDICINE

## 2021-01-01 PROCEDURE — 99212 OFFICE O/P EST SF 10 MIN: CPT

## 2021-01-01 PROCEDURE — 36415 COLL VENOUS BLD VENIPUNCTURE: CPT | Performed by: INTERNAL MEDICINE

## 2021-01-01 PROCEDURE — 36591 DRAW BLOOD OFF VENOUS DEVICE: CPT

## 2021-01-01 PROCEDURE — 99213 OFFICE O/P EST LOW 20 MIN: CPT | Performed by: INTERNAL MEDICINE

## 2021-01-01 PROCEDURE — 71045 X-RAY EXAM CHEST 1 VIEW: CPT

## 2021-01-01 PROCEDURE — 77067 SCR MAMMO BI INCL CAD: CPT

## 2021-01-01 PROCEDURE — 6360000004 HC RX CONTRAST MEDICATION: Performed by: INTERNAL MEDICINE

## 2021-01-01 PROCEDURE — 74177 CT ABD & PELVIS W/CONTRAST: CPT

## 2021-01-01 PROCEDURE — 70450 CT HEAD/BRAIN W/O DYE: CPT

## 2021-01-01 RX ORDER — FLUOROURACIL 50 MG/ML
400 INJECTION, SOLUTION INTRAVENOUS ONCE
Status: COMPLETED | OUTPATIENT
Start: 2021-01-01 | End: 2021-01-01

## 2021-01-01 RX ORDER — HEPARIN SODIUM (PORCINE) LOCK FLUSH IV SOLN 100 UNIT/ML 100 UNIT/ML
500 SOLUTION INTRAVENOUS PRN
Status: DISCONTINUED | OUTPATIENT
Start: 2021-01-01 | End: 2021-01-01 | Stop reason: HOSPADM

## 2021-01-01 RX ORDER — SODIUM CHLORIDE 0.9 % (FLUSH) 0.9 %
5-40 SYRINGE (ML) INJECTION PRN
Status: CANCELLED | OUTPATIENT
Start: 2021-01-01

## 2021-01-01 RX ORDER — METHYLPREDNISOLONE SODIUM SUCCINATE 125 MG/2ML
125 INJECTION, POWDER, LYOPHILIZED, FOR SOLUTION INTRAMUSCULAR; INTRAVENOUS ONCE
Status: CANCELLED | OUTPATIENT
Start: 2021-01-01 | End: 2021-01-01

## 2021-01-01 RX ORDER — HEPARIN SODIUM (PORCINE) LOCK FLUSH IV SOLN 100 UNIT/ML 100 UNIT/ML
500 SOLUTION INTRAVENOUS PRN
Status: CANCELLED | OUTPATIENT
Start: 2021-01-01

## 2021-01-01 RX ORDER — OMEPRAZOLE 20 MG/1
CAPSULE, DELAYED RELEASE ORAL DAILY
COMMUNITY

## 2021-01-01 RX ORDER — CLONIDINE HYDROCHLORIDE 0.1 MG/1
0.1 TABLET ORAL ONCE
Status: COMPLETED | OUTPATIENT
Start: 2021-01-01 | End: 2021-01-01

## 2021-01-01 RX ORDER — SODIUM CHLORIDE 0.9 % (FLUSH) 0.9 %
5-40 SYRINGE (ML) INJECTION PRN
Status: DISCONTINUED | OUTPATIENT
Start: 2021-01-01 | End: 2021-01-01 | Stop reason: HOSPADM

## 2021-01-01 RX ORDER — DIPHENHYDRAMINE HYDROCHLORIDE 50 MG/ML
50 INJECTION INTRAMUSCULAR; INTRAVENOUS ONCE
Status: CANCELLED | OUTPATIENT
Start: 2021-01-01 | End: 2021-01-01

## 2021-01-01 RX ORDER — EPINEPHRINE 1 MG/ML
0.3 INJECTION, SOLUTION, CONCENTRATE INTRAVENOUS PRN
Status: CANCELLED | OUTPATIENT
Start: 2021-01-01

## 2021-01-01 RX ORDER — DILTIAZEM HYDROCHLORIDE 120 MG/1
120 CAPSULE, COATED, EXTENDED RELEASE ORAL DAILY
COMMUNITY

## 2021-01-01 RX ORDER — ONDANSETRON 4 MG/1
4 TABLET, FILM COATED ORAL EVERY 6 HOURS PRN
Qty: 30 TABLET | Refills: 5 | Status: SHIPPED | OUTPATIENT
Start: 2021-01-01

## 2021-01-01 RX ORDER — PALONOSETRON 0.05 MG/ML
0.25 INJECTION, SOLUTION INTRAVENOUS ONCE
Status: COMPLETED | OUTPATIENT
Start: 2021-01-01 | End: 2021-01-01

## 2021-01-01 RX ORDER — SODIUM CHLORIDE 9 MG/ML
INJECTION, SOLUTION INTRAVENOUS CONTINUOUS
Status: CANCELLED | OUTPATIENT
Start: 2021-01-01

## 2021-01-01 RX ORDER — DEXTROSE MONOHYDRATE 50 MG/ML
INJECTION, SOLUTION INTRAVENOUS ONCE
Status: CANCELLED | OUTPATIENT
Start: 2021-01-01 | End: 2021-01-01

## 2021-01-01 RX ORDER — PROMETHAZINE HYDROCHLORIDE 12.5 MG/1
12.5 TABLET ORAL EVERY 6 HOURS PRN
Qty: 60 TABLET | Refills: 0 | Status: SHIPPED | OUTPATIENT
Start: 2021-01-01 | End: 2021-01-01 | Stop reason: SDUPTHER

## 2021-01-01 RX ORDER — DEXAMETHASONE SODIUM PHOSPHATE 10 MG/ML
10 INJECTION, SOLUTION INTRAMUSCULAR; INTRAVENOUS ONCE
Status: COMPLETED | OUTPATIENT
Start: 2021-01-01 | End: 2021-01-01

## 2021-01-01 RX ORDER — DIPHENHYDRAMINE HYDROCHLORIDE 50 MG/ML
50 INJECTION INTRAMUSCULAR; INTRAVENOUS ONCE
Status: COMPLETED | OUTPATIENT
Start: 2021-01-01 | End: 2021-01-01

## 2021-01-01 RX ORDER — SODIUM CHLORIDE 9 MG/ML
25 INJECTION, SOLUTION INTRAVENOUS PRN
Status: CANCELLED | OUTPATIENT
Start: 2021-01-01

## 2021-01-01 RX ORDER — PALONOSETRON 0.05 MG/ML
0.25 INJECTION, SOLUTION INTRAVENOUS ONCE
Status: CANCELLED | OUTPATIENT
Start: 2021-01-01

## 2021-01-01 RX ORDER — FLUOROURACIL 50 MG/ML
400 INJECTION, SOLUTION INTRAVENOUS ONCE
Status: CANCELLED | OUTPATIENT
Start: 2021-01-01

## 2021-01-01 RX ORDER — DEXTROSE MONOHYDRATE 50 MG/ML
INJECTION, SOLUTION INTRAVENOUS ONCE
Status: DISCONTINUED | OUTPATIENT
Start: 2021-01-01 | End: 2021-01-01 | Stop reason: HOSPADM

## 2021-01-01 RX ORDER — SODIUM CHLORIDE 9 MG/ML
INJECTION, SOLUTION INTRAVENOUS ONCE
Status: CANCELLED | OUTPATIENT
Start: 2021-01-01 | End: 2021-01-01

## 2021-01-01 RX ORDER — DIPHENHYDRAMINE HYDROCHLORIDE 50 MG/ML
50 INJECTION INTRAMUSCULAR; INTRAVENOUS EVERY 6 HOURS PRN
Status: DISCONTINUED | OUTPATIENT
Start: 2021-01-01 | End: 2021-01-01 | Stop reason: HOSPADM

## 2021-01-01 RX ORDER — DILTIAZEM HYDROCHLORIDE 120 MG/1
120 CAPSULE, COATED, EXTENDED RELEASE ORAL DAILY
COMMUNITY
Start: 2021-01-01

## 2021-01-01 RX ORDER — HEPARIN SODIUM (PORCINE) LOCK FLUSH IV SOLN 100 UNIT/ML 100 UNIT/ML
500 SOLUTION INTRAVENOUS PRN
Status: DISCONTINUED | OUTPATIENT
Start: 2021-01-01 | End: 2021-01-01

## 2021-01-01 RX ORDER — LOSARTAN POTASSIUM 100 MG/1
100 TABLET ORAL DAILY
COMMUNITY
Start: 2021-01-01

## 2021-01-01 RX ORDER — DEXTROSE MONOHYDRATE 50 MG/ML
INJECTION, SOLUTION INTRAVENOUS ONCE
Status: COMPLETED | OUTPATIENT
Start: 2021-01-01 | End: 2021-01-01

## 2021-01-01 RX ORDER — SODIUM CHLORIDE 9 MG/ML
INJECTION, SOLUTION INTRAVENOUS ONCE
Status: COMPLETED | OUTPATIENT
Start: 2021-01-01 | End: 2021-01-01

## 2021-01-01 RX ORDER — ACETAMINOPHEN 500 MG
1000 TABLET ORAL EVERY 4 HOURS PRN
Status: DISCONTINUED | OUTPATIENT
Start: 2021-01-01 | End: 2021-01-01 | Stop reason: HOSPADM

## 2021-01-01 RX ORDER — ROSUVASTATIN CALCIUM 10 MG/1
10 TABLET, COATED ORAL DAILY
COMMUNITY
Start: 2021-01-01

## 2021-01-01 RX ORDER — DIPHENHYDRAMINE HYDROCHLORIDE 50 MG/ML
25 INJECTION INTRAMUSCULAR; INTRAVENOUS ONCE
Status: COMPLETED | OUTPATIENT
Start: 2021-01-01 | End: 2021-01-01

## 2021-01-01 RX ORDER — ACETAMINOPHEN 500 MG
1000 TABLET ORAL ONCE
Status: COMPLETED | OUTPATIENT
Start: 2021-01-01 | End: 2021-01-01

## 2021-01-01 RX ORDER — PROMETHAZINE HYDROCHLORIDE 12.5 MG/1
12.5 TABLET ORAL EVERY 6 HOURS PRN
Qty: 60 TABLET | Refills: 0 | Status: CANCELLED | OUTPATIENT
Start: 2021-01-01

## 2021-01-01 RX ORDER — METHYLPREDNISOLONE SODIUM SUCCINATE 125 MG/2ML
INJECTION, POWDER, LYOPHILIZED, FOR SOLUTION INTRAMUSCULAR; INTRAVENOUS
Status: COMPLETED
Start: 2021-01-01 | End: 2021-01-01

## 2021-01-01 RX ORDER — HEPARIN SODIUM (PORCINE) LOCK FLUSH IV SOLN 100 UNIT/ML 100 UNIT/ML
SOLUTION INTRAVENOUS
Status: COMPLETED
Start: 2021-01-01 | End: 2021-01-01

## 2021-01-01 RX ORDER — METOPROLOL TARTRATE 50 MG/1
TABLET, FILM COATED ORAL 2 TIMES DAILY
COMMUNITY
Start: 2018-09-13

## 2021-01-01 RX ORDER — SODIUM CHLORIDE 0.9 % (FLUSH) 0.9 %
5-40 SYRINGE (ML) INJECTION PRN
Status: DISCONTINUED | OUTPATIENT
Start: 2021-01-01 | End: 2021-01-01

## 2021-01-01 RX ORDER — ROSUVASTATIN CALCIUM 10 MG/1
10 TABLET, COATED ORAL DAILY
COMMUNITY

## 2021-01-01 RX ORDER — ASPIRIN 81 MG/1
81 TABLET, CHEWABLE ORAL DAILY
COMMUNITY

## 2021-01-01 RX ORDER — LOSARTAN POTASSIUM 50 MG/1
50 TABLET ORAL DAILY
COMMUNITY

## 2021-01-01 RX ORDER — DIPHENHYDRAMINE HYDROCHLORIDE 50 MG/ML
50 INJECTION INTRAMUSCULAR; INTRAVENOUS ONCE
Status: CANCELLED
Start: 2021-01-01 | End: 2021-01-01

## 2021-01-01 RX ORDER — ACETAMINOPHEN 325 MG/1
1000 TABLET ORAL ONCE
Status: CANCELLED
Start: 2021-01-01 | End: 2021-01-01

## 2021-01-01 RX ORDER — PROMETHAZINE HYDROCHLORIDE 12.5 MG/1
12.5 TABLET ORAL EVERY 6 HOURS PRN
Qty: 60 TABLET | Refills: 0 | Status: SHIPPED | OUTPATIENT
Start: 2021-01-01

## 2021-01-01 RX ADMIN — DEXTROSE MONOHYDRATE: 50 INJECTION, SOLUTION INTRAVENOUS at 11:30

## 2021-01-01 RX ADMIN — ACETAMINOPHEN 1000 MG: 500 TABLET ORAL at 09:32

## 2021-01-01 RX ADMIN — OXALIPLATIN 155 MG: 5 INJECTION, SOLUTION, CONCENTRATE INTRAVENOUS at 13:15

## 2021-01-01 RX ADMIN — FLUOROURACIL 4350 MG: 50 INJECTION, SOLUTION INTRAVENOUS at 15:27

## 2021-01-01 RX ADMIN — OXALIPLATIN 150 MG: 5 INJECTION, SOLUTION, CONCENTRATE INTRAVENOUS at 11:28

## 2021-01-01 RX ADMIN — SODIUM CHLORIDE: 9 INJECTION, SOLUTION INTRAVENOUS at 09:32

## 2021-01-01 RX ADMIN — PALONOSETRON 0.25 MG: 0.05 INJECTION, SOLUTION INTRAVENOUS at 09:23

## 2021-01-01 RX ADMIN — FLUOROURACIL 4350 MG: 50 INJECTION, SOLUTION INTRAVENOUS at 14:12

## 2021-01-01 RX ADMIN — HEPARIN 500 UNITS: 100 SYRINGE at 10:28

## 2021-01-01 RX ADMIN — SODIUM CHLORIDE, PRESERVATIVE FREE 10 ML: 5 INJECTION INTRAVENOUS at 12:15

## 2021-01-01 RX ADMIN — SODIUM CHLORIDE: 9 INJECTION, SOLUTION INTRAVENOUS at 09:22

## 2021-01-01 RX ADMIN — FLUOROURACIL 725 MG: 50 INJECTION, SOLUTION INTRAVENOUS at 13:44

## 2021-01-01 RX ADMIN — DEXTROSE MONOHYDRATE: 50 INJECTION, SOLUTION INTRAVENOUS at 09:45

## 2021-01-01 RX ADMIN — ACETAMINOPHEN 1000 MG: 500 TABLET ORAL at 09:22

## 2021-01-01 RX ADMIN — PEGFILGRASTIM 6 MG: KIT SUBCUTANEOUS at 10:28

## 2021-01-01 RX ADMIN — HEPARIN 500 UNITS: 100 SYRINGE at 12:47

## 2021-01-01 RX ADMIN — HEPARIN 500 UNITS: 100 SYRINGE at 12:05

## 2021-01-01 RX ADMIN — Medication 10 ML: at 10:28

## 2021-01-01 RX ADMIN — LEUCOVORIN CALCIUM 700 MG: 350 INJECTION, POWDER, LYOPHILIZED, FOR SOLUTION INTRAMUSCULAR; INTRAVENOUS at 09:45

## 2021-01-01 RX ADMIN — LEUCOVORIN CALCIUM 700 MG: 350 INJECTION, POWDER, LYOPHILIZED, FOR SOLUTION INTRAMUSCULAR; INTRAVENOUS at 11:20

## 2021-01-01 RX ADMIN — FLUOROURACIL 725 MG: 50 INJECTION, SOLUTION INTRAVENOUS at 12:03

## 2021-01-01 RX ADMIN — HEPARIN 500 UNITS: 100 SYRINGE at 12:03

## 2021-01-01 RX ADMIN — FLUOROURACIL 725 MG: 50 INJECTION, SOLUTION INTRAVENOUS at 13:45

## 2021-01-01 RX ADMIN — DEXAMETHASONE SODIUM PHOSPHATE 10 MG: 10 INJECTION, SOLUTION INTRAMUSCULAR; INTRAVENOUS at 11:11

## 2021-01-01 RX ADMIN — DEXAMETHASONE SODIUM PHOSPHATE 10 MG: 10 INJECTION, SOLUTION INTRAMUSCULAR; INTRAVENOUS at 09:33

## 2021-01-01 RX ADMIN — DIPHENHYDRAMINE HYDROCHLORIDE 50 MG: 50 INJECTION INTRAMUSCULAR; INTRAVENOUS at 09:22

## 2021-01-01 RX ADMIN — OXALIPLATIN 150 MG: 5 INJECTION, SOLUTION INTRAVENOUS at 09:53

## 2021-01-01 RX ADMIN — HEPARIN 500 UNITS: 100 SYRINGE at 11:34

## 2021-01-01 RX ADMIN — SODIUM CHLORIDE, PRESERVATIVE FREE 10 ML: 5 INJECTION INTRAVENOUS at 13:28

## 2021-01-01 RX ADMIN — LEUCOVORIN CALCIUM 700 MG: 350 INJECTION, POWDER, LYOPHILIZED, FOR SOLUTION INTRAMUSCULAR; INTRAVENOUS at 09:53

## 2021-01-01 RX ADMIN — FLUOROURACIL 4350 MG: 50 INJECTION, SOLUTION INTRAVENOUS at 13:44

## 2021-01-01 RX ADMIN — SODIUM CHLORIDE, PRESERVATIVE FREE 10 ML: 5 INJECTION INTRAVENOUS at 11:33

## 2021-01-01 RX ADMIN — IOPAMIDOL 75 ML: 755 INJECTION, SOLUTION INTRAVENOUS at 08:42

## 2021-01-01 RX ADMIN — PALONOSETRON 0.25 MG: 0.05 INJECTION, SOLUTION INTRAVENOUS at 12:36

## 2021-01-01 RX ADMIN — FLUOROURACIL 4350 MG: 50 INJECTION, SOLUTION INTRAVENOUS at 12:52

## 2021-01-01 RX ADMIN — HEPARIN 500 UNITS: 100 SYRINGE at 13:52

## 2021-01-01 RX ADMIN — FLUOROURACIL 725 MG: 50 INJECTION, SOLUTION INTRAVENOUS at 12:52

## 2021-01-01 RX ADMIN — DEXAMETHASONE SODIUM PHOSPHATE 10 MG: 10 INJECTION, SOLUTION INTRAMUSCULAR; INTRAVENOUS at 12:36

## 2021-01-01 RX ADMIN — ACETAMINOPHEN 1000 MG: 500 TABLET ORAL at 12:50

## 2021-01-01 RX ADMIN — ACETAMINOPHEN 1000 MG: 500 TABLET ORAL at 09:23

## 2021-01-01 RX ADMIN — PALONOSETRON 0.25 MG: 0.05 INJECTION, SOLUTION INTRAVENOUS at 09:33

## 2021-01-01 RX ADMIN — FLUOROURACIL 725 MG: 50 INJECTION, SOLUTION INTRAVENOUS at 12:00

## 2021-01-01 RX ADMIN — HEPARIN SODIUM (PORCINE) LOCK FLUSH IV SOLN 100 UNIT/ML 500 UNITS: 100 SOLUTION at 15:33

## 2021-01-01 RX ADMIN — HEPARIN 500 UNITS: 100 SYRINGE at 15:33

## 2021-01-01 RX ADMIN — PALONOSETRON HYDROCHLORIDE 0.25 MG: 0.25 INJECTION, SOLUTION INTRAVENOUS at 10:08

## 2021-01-01 RX ADMIN — HEPARIN 500 UNITS: 100 SYRINGE at 09:19

## 2021-01-01 RX ADMIN — SODIUM CHLORIDE, PRESERVATIVE FREE 20 ML: 5 INJECTION INTRAVENOUS at 11:21

## 2021-01-01 RX ADMIN — DEXTROSE MONOHYDRATE: 50 INJECTION, SOLUTION INTRAVENOUS at 09:44

## 2021-01-01 RX ADMIN — SODIUM CHLORIDE, PRESERVATIVE FREE 10 ML: 5 INJECTION INTRAVENOUS at 15:33

## 2021-01-01 RX ADMIN — HEPARIN SODIUM (PORCINE) LOCK FLUSH IV SOLN 100 UNIT/ML 500 UNITS: 100 SOLUTION at 11:21

## 2021-01-01 RX ADMIN — HEPARIN 500 UNITS: 100 SYRINGE at 12:36

## 2021-01-01 RX ADMIN — SODIUM CHLORIDE, PRESERVATIVE FREE 10 ML: 5 INJECTION INTRAVENOUS at 12:47

## 2021-01-01 RX ADMIN — DEXAMETHASONE SODIUM PHOSPHATE 10 MG: 10 INJECTION, SOLUTION INTRAMUSCULAR; INTRAVENOUS at 12:06

## 2021-01-01 RX ADMIN — DEXAMETHASONE SODIUM PHOSPHATE 10 MG: 10 INJECTION, SOLUTION INTRAMUSCULAR; INTRAVENOUS at 09:22

## 2021-01-01 RX ADMIN — HEPARIN 500 UNITS: 100 SYRINGE at 10:25

## 2021-01-01 RX ADMIN — FLUOROURACIL 725 MG: 50 INJECTION, SOLUTION INTRAVENOUS at 15:40

## 2021-01-01 RX ADMIN — DIPHENHYDRAMINE HYDROCHLORIDE 50 MG: 50 INJECTION, SOLUTION INTRAMUSCULAR; INTRAVENOUS at 09:33

## 2021-01-01 RX ADMIN — DEXAMETHASONE SODIUM PHOSPHATE 10 MG: 10 INJECTION, SOLUTION INTRAMUSCULAR; INTRAVENOUS at 09:23

## 2021-01-01 RX ADMIN — SODIUM CHLORIDE, PRESERVATIVE FREE 10 ML: 5 INJECTION INTRAVENOUS at 12:36

## 2021-01-01 RX ADMIN — LEUCOVORIN CALCIUM 700 MG: 350 INJECTION, POWDER, LYOPHILIZED, FOR SOLUTION INTRAMUSCULAR; INTRAVENOUS at 13:42

## 2021-01-01 RX ADMIN — LEUCOVORIN CALCIUM 700 MG: 350 INJECTION, POWDER, LYOPHILIZED, FOR SOLUTION INTRAMUSCULAR; INTRAVENOUS at 09:48

## 2021-01-01 RX ADMIN — METHYLPREDNISOLONE SODIUM SUCCINATE 125 MG: 125 INJECTION, POWDER, FOR SOLUTION INTRAMUSCULAR; INTRAVENOUS at 15:56

## 2021-01-01 RX ADMIN — DIPHENHYDRAMINE HYDROCHLORIDE 25 MG: 50 INJECTION, SOLUTION INTRAMUSCULAR; INTRAVENOUS at 17:35

## 2021-01-01 RX ADMIN — SODIUM CHLORIDE, PRESERVATIVE FREE 10 ML: 5 INJECTION INTRAVENOUS at 10:13

## 2021-01-01 RX ADMIN — OXALIPLATIN 150 MG: 5 INJECTION, SOLUTION INTRAVENOUS at 09:46

## 2021-01-01 RX ADMIN — PALONOSETRON 0.25 MG: 0.05 INJECTION, SOLUTION INTRAVENOUS at 11:11

## 2021-01-01 RX ADMIN — PALONOSETRON 0.25 MG: 0.05 INJECTION, SOLUTION INTRAVENOUS at 09:22

## 2021-01-01 RX ADMIN — Medication 10 ML: at 13:51

## 2021-01-01 RX ADMIN — SODIUM CHLORIDE, PRESERVATIVE FREE 10 ML: 5 INJECTION INTRAVENOUS at 10:25

## 2021-01-01 RX ADMIN — PEGFILGRASTIM 6 MG: KIT SUBCUTANEOUS at 13:46

## 2021-01-01 RX ADMIN — HEPARIN 500 UNITS: 100 SYRINGE at 13:28

## 2021-01-01 RX ADMIN — DIPHENHYDRAMINE HYDROCHLORIDE 50 MG: 50 INJECTION INTRAMUSCULAR; INTRAVENOUS at 12:51

## 2021-01-01 RX ADMIN — OXALIPLATIN 150 MG: 5 INJECTION, SOLUTION INTRAVENOUS at 13:05

## 2021-01-01 RX ADMIN — FLUOROURACIL 4350 MG: 50 INJECTION, SOLUTION INTRAVENOUS at 12:03

## 2021-01-01 RX ADMIN — HEPARIN 500 UNITS: 100 SYRINGE at 11:21

## 2021-01-01 RX ADMIN — DEXAMETHASONE SODIUM PHOSPHATE 10 MG: 10 INJECTION, SOLUTION INTRAMUSCULAR; INTRAVENOUS at 10:08

## 2021-01-01 RX ADMIN — SODIUM CHLORIDE: 9 INJECTION, SOLUTION INTRAVENOUS at 10:08

## 2021-01-01 RX ADMIN — LEUCOVORIN CALCIUM 700 MG: 350 INJECTION, POWDER, LYOPHILIZED, FOR SOLUTION INTRAMUSCULAR; INTRAVENOUS at 11:28

## 2021-01-01 RX ADMIN — ACETAMINOPHEN 1000 MG: 500 TABLET ORAL at 10:08

## 2021-01-01 RX ADMIN — FLUOROURACIL 725 MG: 50 INJECTION, SOLUTION INTRAVENOUS at 15:27

## 2021-01-01 RX ADMIN — DIPHENHYDRAMINE HYDROCHLORIDE 50 MG: 50 INJECTION INTRAMUSCULAR; INTRAVENOUS at 10:08

## 2021-01-01 RX ADMIN — HEPARIN 500 UNITS: 100 SYRINGE at 10:13

## 2021-01-01 RX ADMIN — PALONOSETRON 0.25 MG: 0.05 INJECTION, SOLUTION INTRAVENOUS at 12:05

## 2021-01-01 RX ADMIN — HEPARIN 500 UNITS: 100 SYRINGE at 12:15

## 2021-01-01 RX ADMIN — DIPHENHYDRAMINE HYDROCHLORIDE 50 MG: 50 INJECTION, SOLUTION INTRAMUSCULAR; INTRAVENOUS at 09:23

## 2021-01-01 RX ADMIN — CLONIDINE HYDROCHLORIDE 0.1 MG: 0.1 TABLET ORAL at 15:59

## 2021-01-01 RX ADMIN — Medication 10 ML: at 12:03

## 2021-01-01 RX ADMIN — FLUOROURACIL 4350 MG: 50 INJECTION, SOLUTION INTRAVENOUS at 12:02

## 2021-01-01 RX ADMIN — SODIUM CHLORIDE, PRESERVATIVE FREE 10 ML: 5 INJECTION INTRAVENOUS at 09:19

## 2021-01-01 RX ADMIN — LEUCOVORIN CALCIUM 700 MG: 350 INJECTION, POWDER, LYOPHILIZED, FOR SUSPENSION INTRAMUSCULAR; INTRAVENOUS at 13:16

## 2021-01-01 RX ADMIN — SODIUM CHLORIDE, PRESERVATIVE FREE 10 ML: 5 INJECTION INTRAVENOUS at 12:05

## 2021-01-01 RX ADMIN — SODIUM CHLORIDE: 9 INJECTION, SOLUTION INTRAVENOUS at 12:36

## 2021-01-01 RX ADMIN — OXALIPLATIN 150 MG: 5 INJECTION, SOLUTION INTRAVENOUS at 11:20

## 2021-01-01 RX ADMIN — FLUOROURACIL 4350 MG: 50 INJECTION, SOLUTION INTRAVENOUS at 15:40

## 2021-01-01 ASSESSMENT — ENCOUNTER SYMPTOMS
EYE PAIN: 0
COLOR CHANGE: 0
ABDOMINAL DISTENTION: 0
EYE DISCHARGE: 0
APNEA: 0
NAUSEA: 0
ABDOMINAL PAIN: 0
PHOTOPHOBIA: 0
BACK PAIN: 0
SHORTNESS OF BREATH: 1
COUGH: 0
SORE THROAT: 0
RHINORRHEA: 0

## 2021-01-01 ASSESSMENT — PAIN SCALES - GENERAL
PAINLEVEL_OUTOF10: 0

## 2021-04-14 NOTE — TELEPHONE ENCOUNTER
Called to let patient know of her appointment for Ct Chest at [facility] on 04/16/21 @ 10 am and to be fasting.

## 2021-04-26 NOTE — PROGRESS NOTES
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 CEA was normal 3.7. She continues to smoke. We had a long discussion today about chemotherapy and side effects. She will start treatment next week. Mediport placement next week. IHC MMR was reviewed and showed no loss of expression. Cancer History:  Angelica Leija was first seen by me on 4/13/2021. She was referred by Dr. Kong Godfrey. She has been diagnosed with sigmoid colonic adenocarcinoma. · 4/5/2021CT abdomen and pelvis with IV contrast showed multilevel spondylitic change, no evidence of liver metastasis. Area of marked circumferential narrowing with prominent small pericolonic nodes at the level of the mid sigmoid. · 4/5/21 Colonoscopy: Near obstructive lesion at 30cm. · 4/7/21 Peritoneal implants, biopsies: No malignancy identified. Nodular dystrophic calcification, histiocytes, and chronic inflammation. Sigmoid colon, partial colectomy: Mucinous adenocarcinoma. Maximum tumor at least 5.5 cm. Tumor extends through muscularis, and involves serosal surfaces. The proximal and distal surgical margins are free of tumor. Lymphovascular invasion is present. 18 of 20 lymph nodes are positive for metastatic carcinoma. Please see CAP synoptic. Anastomotic rings, excision: The surgical margins are viable and free of tumor. · 4/13/2001mary was first seen by me. Discussed results of pathology image studies. Recommended completion of staging with CT chest.  CEA postoperatively ordered today. She will likely be a candidate for adjuvant chemotherapy FOLFOX. · 4/16/21 CEA 3.7 DPD- negative   · 4/23/21- IHC MMR-no loss of expression    Past Medical History:    Past Medical History:   Diagnosis Date    Colon cancer ([address] 04/05/2021    GERD (gastroesophageal reflux disease)     Hypertension     Palpitation        Past Surgical History:    Past Surgical History:   Procedure Laterality Date    BREAST LUMPECTOMY      CATARACT REMOVAL      COLONOSCOPY  04/05/2021    SUBTOTAL COLECTOMY  04/07/2021    TUBAL LIGATION         Social History:    Smoking status: Currently smoker. Quit 2 weeks ago  ETOH status: No  Resides: [place], [place]    Family History:   Family History   Problem Relation Age of Onset    Heart Attack Mother     COPD Father     Heart Attack Father     Breast Cancer Maternal Aunt     Colon Cancer Maternal Uncle     Colon Cancer Paternal Cousin     Colon Cancer Maternal Uncle     Cancer Paternal Cousin         Lung Cancer       Current Hospital Medications:    Current Outpatient Medications   Medication Sig Dispense Refill    promethazine (PHENERGAN) 12.5 MG tablet Take 1 tablet by mouth every 6 hours as needed for Nausea 60 tablet 0    rosuvastatin (CRESTOR) 10 MG tablet Take 10 mg by mouth daily      losartan (COZAAR) 50 MG tablet Take 50 mg by mouth daily      aspirin 81 MG chewable tablet Take 81 mg by mouth daily      omeprazole (PRILOSEC) 20 MG capsule Take 20 mg by mouth daily      metoprolol succinate ER (TOPROL-XL) 25 MG XL tablet Take 25 mg by mouth daily       No current facility-administered medications for this visit.         Allergies: No Known Allergies      Subjective   REVIEW OF SYSTEMS:   CONSTITUTIONAL: no fever, no night sweats, no fatigue;  HEENT: no blurring of vision, no double vision, no hearing difficulty, no tinnitus, no ulceration, no dysplasia, no epistaxis;  LUNGS: no cough, no hemoptysis, no wheeze,  no shortness of breath;  CARDIOVASCULAR: no hernia. 4/16/21 CT chest: There are no [pulmonary nodules. No pulmonary infiltrates. No scarring. No evidence of any pathologically enlarged lymph nodes. Aberrant origin of the right subclavian artery. Small hiatal hernia. Fatty left Bochdalek's hernia. Other findings as described. ASSESSMENT:    No orders of the defined types were placed in this encounter. Corona Lantigua was seen today for colon cancer. Diagnoses and all orders for this visit:    Care plan discussed with patient    Malignant neoplasm of sigmoid colon ([facility] [address]    Other orders  -     promethazine (PHENERGAN) 12.5 MG tablet; Take 1 tablet by mouth every 6 hours as needed for Nausea      Stage [id number] IIIC sigmoid colonic adenocarcinoma, grade 2, IHC MMR no loss of expression. Essentially, local advanced malignancy of the sigmoid colon. 18 lymph nodes involved. Discussed with family about adjuvant chemotherapy FOLFOX, logistics and side effects and management. I discussed this also with Dr. Digna White the referring physician. CT chest was clear. Patient to begin Adjuvant FOLFOX Cycle #1/12 next week. PLAN:  Continue with port placement with Dr. Timmy Gonzalez this Thursday  Begin FOLFOX next week  RTC with MD Cycle 2    ISebastien am scribing for Alfred Terry MD. Electronically signed by Sebastien Springer RN on 4/27/2021 at 3:34 PM CDT. I, Dr Gretchen Cheng, personally performed the services described in this documentation as scribed by Sebastien Springer RN in my presence and is both accurate and complete. I have seen, examined and reviewed this patient medication list, appropriate labs and imaging studies. I reviewed relevant medical records and others physicians notes. I discussed the plans of care with the patient. I answered all the questions to the patients satisfaction.  I have also reviewed the chief complaint (CC) and part of the history (History of Present Illness (HPI), Past Family Social History ([address] [place] [address] Nw), or Review of Systems (ROS) and made changes when appropriated.        (Please note that portions of this note were completed with a voice recognition program. Efforts were made to edit the dictations but occasionally words are mis-transcribed.)  Tono Kim MD    04/27/21  5:33 PM

## 2021-04-27 PROBLEM — C18.7 MALIGNANT NEOPLASM OF SIGMOID COLON (HCC): Status: ACTIVE | Noted: 2021-01-01

## 2021-05-17 NOTE — PROGRESS NOTES
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 The patient had a CT chest performed that showed no evidence of metastatic disease. CEA was normal 3.7. She continues to smoke. She was started on chemotherapy with FOLFOX. She received first cycle about 2 weeks ago. She tolerated treatment with complaints of nausea/vomiting x2 days, fatigue and chemotherapy-induced cold neuropathy. She felt better 5 days later. Denies any diarrhea but has some constipation. Cancer History:  Stephen Brizuela was first seen by me on 4/13/2021. She was referred by Dr. Hector Sanchez. She has been diagnosed with sigmoid colonic adenocarcinoma. · 4/5/2021CT abdomen and pelvis with IV contrast showed multilevel spondylitic change, no evidence of liver metastasis. Area of marked circumferential narrowing with prominent small pericolonic nodes at the level of the mid sigmoid. · 4/5/21 Colonoscopy: Near obstructive lesion at 30cm. · 4/7/21 Peritoneal implants, biopsies: No malignancy identified. Nodular dystrophic calcification, histiocytes, and chronic inflammation. Sigmoid colon, partial colectomy: Mucinous adenocarcinoma. Maximum tumor at least 5.5 cm. Tumor extends through muscularis, and involves serosal surfaces. The proximal and distal surgical margins are free of tumor. Lymphovascular invasion is present.  18 of 20 lymph nodes are positive for metastatic carcinoma. Please see CAP synoptic. Anastomotic rings, excision: The surgical margins are viable and free of tumor. · 4/13/2001shtanika was first seen by me. Discussed results of pathology image studies. Recommended completion of staging with CT chest.  CEA postoperatively ordered today. She will likely be a candidate for adjuvant chemotherapy FOLFOX. · 4/16/21 CEA 3.7 DPD- negative   · 4/23/21- IHC MMR-no loss of expression  · 5/4/2001postoperative CEA 1.9  · 5/4/2021adjuvant FOLFOX    Past Medical History:    Past Medical History:   Diagnosis Date    Colon cancer ([facility] [address] 04/05/2021    GERD (gastroesophageal reflux disease)     Hypertension     Palpitation        Past Surgical History:    Past Surgical History:   Procedure Laterality Date    BREAST LUMPECTOMY      CATARACT REMOVAL      COLONOSCOPY  04/05/2021    SUBTOTAL COLECTOMY  04/07/2021    TUBAL LIGATION         Social History:    Smoking status: Currently smoker.   Quit 2 weeks ago  ETOH status: No  Resides: [place], [place]    Family History:   Family History   Problem Relation Age of Onset    Heart Attack Mother     COPD Father     Heart Attack Father     Breast Cancer Maternal Aunt     Colon Cancer Maternal Uncle     Colon Cancer Paternal Cousin     Colon Cancer Maternal Uncle     Cancer Paternal Cousin         Lung Cancer       Current Hospital Medications:    Current Outpatient Medications   Medication Sig Dispense Refill    promethazine (PHENERGAN) 12.5 MG tablet Take 1 tablet by mouth every 6 hours as needed for Nausea 60 tablet 0    rosuvastatin (CRESTOR) 10 MG tablet Take 10 mg by mouth daily      losartan (COZAAR) 50 MG tablet Take 50 mg by mouth daily      aspirin 81 MG chewable tablet Take 81 mg by mouth daily      omeprazole (PRILOSEC) 20 MG capsule Take 20 mg by mouth daily      metoprolol succinate ER (TOPROL-XL) 25 MG XL tablet Take 25 mg by mouth daily       No current facility-administered medications for this visit. Allergies: No Known Allergies      Subjective   REVIEW OF SYSTEMS:   CONSTITUTIONAL: no fever, no night sweats, fatigue;  HEENT: no blurring of vision, no double vision, no hearing difficulty, no tinnitus, no ulceration, no dysplasia, no epistaxis;  LUNGS: no cough, no hemoptysis, no wheeze,  no shortness of breath;  CARDIOVASCULAR: no palpitation, no chest pain, no shortness of breath;  GI: no abdominal pain, nausea, vomiting, no diarrhea,  constipation;  BERT: no dysuria, no hematuria, no frequency or urgency, no nephrolithiasis;  MUSCULOSKELETAL: no joint pain, no swelling, no stiffness;  ENDOCRINE: no polyuria, no polydipsia, no cold or heat intolerance;  HEMATOLOGY: no easy bruising or bleeding, no history of clotting disorder;  DERMATOLOGY: no skin rash, no eczema, no pruritus;  PSYCHIATRY: no depression, no anxiety, no panic attacks, no suicidal ideation, no homicidal ideation;  NEUROLOGY: no syncope, no seizures, no numbness or tingling of hands, no numbness or tingling of feet, no paresis;    Objective   BP (!) 148/78 Comment: manual  Pulse 51   Temp 97.5 °F (36.4 °C)   Resp 18   Ht 5' 5\" (1.651 m)   Wt 156 lb 3.2 oz (70.9 kg)   SpO2 97%   BMI 25.99 kg/m²     PHYSICAL EXAM:  CONSTITUTIONAL: Alert, appropriate, no acute distress  EYES: Non icteric, EOM intact, pupils equal round   ENT: Mucus membranes moist, no oral pharyngeal lesions, external inspection of ears and nose are normal  NECK: Supple, no masses. No palpable thyroid mass  CHEST/LUNGS: CTA bilaterally, normal respiratory effort   CARDIOVASCULAR: RRR, no murmurs. No lower extremity edema  ABDOMEN: soft non-tender, active bowel sounds, no HSM. No palpable masses  EXTREMITIES: warm, full ROM in all 4 extremities, no focal weakness. SKIN: warm, dry with no rashes or lesions  LYMPH: No cervical, clavicular, axillary, or inguinal lymphadenopathy  NEUROLOGIC: follows commands, non focal   PSYCH: mood and affect appropriate. Alert and oriented to time, place, person      LABORATORY RESULTS REVIEWED/ANALYZED BY ME:  5/4/21 CEA 1.9  Lab Results   Component Value Date    WBC 4.65 05/18/2021    HGB 12.0 05/18/2021    HCT 36.3 05/18/2021    MCV 94.8 05/18/2021     (L) 05/18/2021     Lab Results   Component Value Date    NEUTROABS 2.49 05/18/2021     Lab Results   Component Value Date     05/18/2021    K 4.0 05/18/2021     05/18/2021    CO2 28 05/18/2021    BUN 18 (H) 05/18/2021    CREATININE 0.7 05/18/2021    GLUCOSE 94 05/18/2021    CALCIUM 8.7 05/18/2021    PROT 6.3 05/18/2021    LABALBU 3.7 05/18/2021    BILITOT 0.4 05/18/2021    ALKPHOS 119 (H) 05/18/2021    AST 43 (H) 05/18/2021    ALT 18 05/18/2021    LABGLOM >60 05/18/2021    GLOB 2.7 05/18/2021     RADIOLOGY STUDIES REVIEWED BY ME:  Not found    ASSESSMENT:    Orders Placed This Encounter   Procedures    Comprehensive Metabolic Panel     Standing Status:   Future     Number of Occurrences:   1     Standing Expiration Date:   5/18/2022      Saurabh Barnes was seen today for chemotherapy. Diagnoses and all orders for this visit:    Malignant neoplasm of sigmoid colon ([facility] [address]  -     Comprehensive Metabolic Panel; Future  -     Comprehensive Metabolic Panel  -     CBC Auto Differential    Chemotherapy management, encounter for    Adverse effect of chemotherapy, subsequent encounter    Care plan discussed with patient      Stage [id number] IIIC sigmoid colonic adenocarcinoma, grade 2, IHC MMR no loss of expression. Essentially, local advanced malignancy of the sigmoid colon. 18 lymph nodes involved. Discussed with family about adjuvant chemotherapy FOLFOX, logistics and side effects and management. I discussed this also with Dr. Kristal Nichols the referring physician. 5/4/2021started on adjuvant chemotherapy FOLFOX    Treatment-related toxicitynausea, vomiting, fatigue.      PLAN:  C#2/12 FOLFOX today  RTC with MD Cycle 4  CBC/CMP to assess toxicity  Take Phenergan for

## 2021-05-18 NOTE — ED PROVIDER NOTES
[facility] EMERGENCY DEPT  eMERGENCYdEPARTMENT eNCOUnter      Pt Name: Liberty Moritz  MRN: [id number]  [place] 1945  Date of evaluation: 5/18/2021  Provider:PATRICK Simons    CHIEF COMPLAINT       Chief Complaint   Patient presents with    Respiratory Distress         HISTORY OF PRESENT ILLNESS  (Location/Symptom, Timing/Onset, Context/Setting, Quality, Duration, Modifying Factors, Severity.)   Liberty Moritz is a 77 y.o. female who presents to the emergency department with complaints of difficulty with making words lip trembling and brief episodic dyspnea. The patient states that this is resolving she states that she got her second dose of FOLFOX chemotherapy for known colorectal cancer. She is seen by Dr. Marissa Shah. She drinks some water and took a Protonix tablet following her treatment she states it was room temperature was told to avoid cold drinks following treatment. She denies any nausea vomiting no chest pain or pleurisy. Symptoms are resolving this happened about 30 minutes ago patient's  is present confirming that her verbal issue is different from baseline they are wanting stroke ruled out. Denies any weakness any actual confusion or difficulty with ambulating. HPI    Nursing Notes were reviewed and I agree. REVIEW OF SYSTEMS    (2-9 systems for level 4, 10 or more for level 5)     Review of Systems   Constitutional: Negative for activity change, appetite change, chills and fever. HENT: Negative for congestion, postnasal drip, rhinorrhea and sore throat. Eyes: Negative for photophobia, pain, discharge and visual disturbance. Respiratory: Positive for shortness of breath. Negative for apnea and cough. Cardiovascular: Negative for chest pain and leg swelling. Gastrointestinal: Negative for abdominal distention, abdominal pain and nausea. Genitourinary: Negative for vaginal bleeding.    Musculoskeletal: Negative for arthralgias, back pain, joint swelling, neck pain and neck stiffness. Skin: Negative for color change and rash. Neurological: Positive for speech difficulty. Negative for dizziness, syncope, facial asymmetry and headaches. Hematological: Negative for adenopathy. Does not bruise/bleed easily. Psychiatric/Behavioral: Negative for agitation, behavioral problems and confusion. Except as noted above the remainder of the review of systems was reviewed and negative. PAST MEDICAL HISTORY     Past Medical History:   Diagnosis Date    Colon cancer ([facility] [address] 04/05/2021    GERD (gastroesophageal reflux disease)     Hypertension     Palpitation          SURGICAL HISTORY       Past Surgical History:   Procedure Laterality Date    BREAST LUMPECTOMY      CATARACT REMOVAL      COLONOSCOPY  04/05/2021    SUBTOTAL COLECTOMY  04/07/2021    TUBAL LIGATION           CURRENT MEDICATIONS       Discharge Medication List as of 5/18/2021  4:53 PM      CONTINUE these medications which have NOT CHANGED    Details   promethazine (PHENERGAN) 12.5 MG tablet Take 1 tablet by mouth every 6 hours as needed for Nausea, Disp-60 tablet, R-0Normal      rosuvastatin (CRESTOR) 10 MG tablet Take 10 mg by mouth dailyHistorical Med      losartan (COZAAR) 50 MG tablet Take 50 mg by mouth dailyHistorical Med      aspirin 81 MG chewable tablet Take 81 mg by mouth daily      omeprazole (PRILOSEC) 20 MG capsule Take 20 mg by mouth daily      metoprolol succinate ER (TOPROL-XL) 25 MG XL tablet Take 25 mg by mouth daily             ALLERGIES     Patient has no known allergies.     FAMILY HISTORY       Family History   Problem Relation Age of Onset    Heart Attack Mother     COPD Father     Heart Attack Father     Breast Cancer Maternal Aunt     Colon Cancer Maternal Uncle     Colon Cancer Paternal Cousin     Colon Cancer Maternal Uncle     Cancer Paternal Cousin         Lung Cancer          SOCIAL HISTORY       Social History     Socioeconomic History    Marital status:      Spouse name: None    Number of children: None    Years of education: None    Highest education level: None   Occupational History    None   Tobacco Use    Smoking status: Former Smoker     Packs/day: 1.00     Years: 54.00     Pack years: 54.00     Types: Cigarettes     Start date: 4/5/2021    Smokeless tobacco: Former User   Substance and Sexual Activity    Alcohol use: No    Drug use: No    Sexual activity: None   Other Topics Concern    None   Social History Narrative    None     Social Determinants of Health     Financial Resource Strain:     Difficulty of Paying Living Expenses:    Food Insecurity:     Worried About Running Out of Food in the Last Year:     Ran Out of Food in the Last Year:    Transportation Needs:     Lack of Transportation (Medical):  Lack of Transportation (Non-Medical):    Physical Activity:     Days of Exercise per Week:     Minutes of Exercise per Session:    Stress:     Feeling of Stress :    Social Connections:     Frequency of Communication with Friends and Family:     Frequency of Social Gatherings with Friends and Family:     Attends [employer] Services:     Active Member of Clubs or Organizations:     Attends Club or Organization Meetings:     Marital Status:    Intimate Partner Violence:     Fear of Current or Ex-Partner:     Emotionally Abused:     Physically Abused:     Sexually Abused:        SCREENINGS           PHYSICAL EXAM    (up to 7 forlevel 4, 8 or more for level 5)     ED Triage Vitals   BP Temp Temp src Pulse Resp SpO2 Height Weight   05/18/21 1431 05/18/21 1441 -- 05/18/21 1431 05/18/21 1431 05/18/21 1431 -- --   (!) 167/120 98.3 °F (36.8 °C)  69 16 97 %         Physical Exam  Vitals and nursing note reviewed. Constitutional:       General: She is not in acute distress. Appearance: Normal appearance. She is well-developed. She is not diaphoretic. HENT:      Head: Normocephalic and atraumatic.       Right Ear: External ear normal.      Left Duc Mccain on 5/18/2021 4:04 PM      XR CHEST PORTABLE   Final Result   No active cardiopulmonary disease. Signed by Dr Allison Martínez on 5/18/2021 2:56 PM          LABS:  Labs Reviewed   CBC WITH AUTO DIFFERENTIAL - Abnormal; Notable for the following components:       Result Value    WBC 4.0 (*)     MCH 31.9 (*)     Neutrophils % 70.2 (*)     Lymphocytes Absolute 1.0 (*)     All other components within normal limits   COMPREHENSIVE METABOLIC PANEL W/ REFLEX TO MG FOR LOW K - Abnormal; Notable for the following components:    Alkaline Phosphatase 133 (*)     All other components within normal limits   URINE RT REFLEX TO CULTURE       All other labs were within normal range or notreturned as of this dictation. RE-ASSESSMENT        EMERGENCY DEPARTMENT COURSE and DIFFERENTIAL DIAGNOSIS/MDM:   Vitals:    Vitals:    05/18/21 1433 05/18/21 1441 05/18/21 1501 05/18/21 1532   BP: (!) 168/85 (!) 168/78 (!) 154/80 136/78   Pulse: 71 88 61 62   Resp: 17 20 14 11   Temp:  98.3 °F (36.8 °C)     SpO2: 95% 94% (!) 88% 93%       MDM  Patient continues to have resolve of symptoms medically with BP trending down as well. Nothing acutely seen on head or chest with normal telemetry and blood work. I spoke with Dr. Ronaldo Garvin her oncologist tells me that this is actually somewhat of a rate and regular reaction following the second or third dosage recommends Benadryl 25 tonight and  50 tomorrow with close follow-up in office which patient is agreeable we will discharge at this time. PROCEDURES:    Procedures      FINAL IMPRESSION      1.  Medication reaction, initial encounter          DISPOSITION/PLAN   DISPOSITION Decision To Discharge 05/18/2021 05:46:32 PM      PATIENT REFERRED TO:  [facility] EMERGENCY DEPT  [facility]  [phone]    If symptoms worsen    Valentine Uriostegui MD  [address] [place] [address] Dr Feliciano Craft [address] [facility] [phone]    Call         DISCHARGE MEDICATIONS:  Discharge Medication List as of 5/18/2021  4:53 PM          (Please note that portions of this note were completed with a voice recognition program.  Efforts were made to edit the dictations but occasionallywords are mis-transcribed.)    Cordelia Caban [address] [place] [address]  05/18/21 [id number]

## 2021-05-18 NOTE — ED NOTES
Bed: 18  Expected date:   Expected time:   Means of arrival:   Comments:  Tayo Jones RN  05/18/21 [id number]

## 2021-06-07 NOTE — PROGRESS NOTES
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 The patient had a CT chest performed that showed no evidence of metastatic disease. CEA was normal 3.7->1.9. She continues to smoke. She was started on chemotherapy with FOLFOX. She tolerated the first cycle with complaints of nausea/vomiting x2 days, fatigue and chemotherapy-induced cold neuropathy. After cycle #2, the patient reports tremors, muscle cramping, difficulty breathing after drinking water, and speech alteration immediately after leaving her appointment. She was seen in the ER and was discharged home same day. Cancer History:  Neville Campos was first seen by me on 4/13/2021. She was referred by Dr. Barbara Duran. She has been diagnosed with sigmoid colonic adenocarcinoma. · 4/5/2021CT abdomen and pelvis with IV contrast showed multilevel spondylitic change, no evidence of liver metastasis. Area of marked circumferential narrowing with prominent small pericolonic nodes at the level of the mid sigmoid. · 4/5/21 Colonoscopy: Near obstructive lesion at 30cm. · 4/7/21 Peritoneal implants, biopsies: No malignancy identified. Nodular dystrophic calcification, histiocytes, and chronic inflammation. Sigmoid colon, partial colectomy: Mucinous adenocarcinoma. Maximum tumor at least 5.5 cm. Tumor extends through muscularis, and involves serosal surfaces.  The proximal and distal surgical margins are free of tumor. Lymphovascular invasion is present. 18 of 20 lymph nodes are positive for metastatic carcinoma. Please see CAP synoptic. Anastomotic rings, excision: The surgical margins are viable and free of tumor. · 4/13/2001mary was first seen by me. Discussed results of pathology image studies. Recommended completion of staging with CT chest.  CEA postoperatively ordered today. She will likely be a candidate for adjuvant chemotherapy FOLFOX. · 4/16/21 CEA 3.7 DPD- negative   · 4/23/21- IHC MMR-no loss of expression  · 5/4/2001postoperative CEA 1.9  · 5/4/2021adjuvant FOLFOX  ·     Past Medical History:    Past Medical History:   Diagnosis Date    Colon cancer ([address] 04/05/2021    GERD (gastroesophageal reflux disease)     Hypertension     Palpitation        Past Surgical History:    Past Surgical History:   Procedure Laterality Date    BREAST LUMPECTOMY      CATARACT REMOVAL      COLONOSCOPY  04/05/2021    SUBTOTAL COLECTOMY  04/07/2021    TUBAL LIGATION         Social History:    Smoking status: Currently smoker.   Quit 2 weeks ago  ETOH status: No  Resides: [facility], [place]    Family History:   Family History   Problem Relation Age of Onset    Heart Attack Mother     COPD Father     Heart Attack Father     Breast Cancer Maternal Aunt     Colon Cancer Maternal Uncle     Colon Cancer Paternal Cousin     Colon Cancer Maternal Uncle     Cancer Paternal Cousin         Lung Cancer       Current Hospital Medications:    Current Outpatient Medications   Medication Sig Dispense Refill    ondansetron (ZOFRAN) 4 MG tablet Take 1 tablet by mouth every 6 hours as needed for Nausea or Vomiting 30 tablet 5    promethazine (PHENERGAN) 12.5 MG tablet Take 1 tablet by mouth every 6 hours as needed for Nausea 60 tablet 0    rosuvastatin (CRESTOR) 10 MG tablet Take 10 mg by mouth daily      losartan (COZAAR) 50 MG tablet Take 50 mg by mouth daily      aspirin 81 MG chewable tablet Take 81 mg by mouth daily      omeprazole (PRILOSEC) 20 MG capsule Take 20 mg by mouth daily      metoprolol succinate ER (TOPROL-XL) 25 MG XL tablet Take 25 mg by mouth daily       No current facility-administered medications for this visit.      Facility-Administered Medications Ordered in Other Visits   Medication Dose Route Frequency Provider Last Rate Last Admin    dextrose 5 % solution   Intravenous Once Azar Prieto MD        acetaminophen (TYLENOL) tablet 1,000 mg  1,000 mg Oral Q4H PRN Azar Prieto MD   1,000 mg at 06/07/21 1250    diphenhydrAMINE (BENADRYL) injection 50 mg  50 mg Intravenous Q6H PRN Azar Prieto MD   50 mg at 06/07/21 1251    fluorouracil (ADRUCIL) 4,350 mg in sodium chloride 0.9 % 250 mL chemo infusion  2,400 mg/m2 (Treatment Plan Recorded) Intravenous Over 46 hours Azar Prieto MD 5.4 mL/hr at 06/07/21 1527 4,350 mg at 06/07/21 1527    heparin flush 100 UNIT/ML injection 500 Units  500 Units Intracatheter PRN Azar Prieto MD        sodium chloride flush 0.9 % injection 5-40 mL  5-40 mL Intravenous PRN Azar Prieto MD           Allergies: No Known Allergies      Subjective   REVIEW OF SYSTEMS:   CONSTITUTIONAL: no fever, no night sweats, fatigue;  HEENT: no blurring of vision, no double vision, no hearing difficulty, no tinnitus, no ulceration, no dysplasia, no epistaxis;  LUNGS: no cough, no hemoptysis, no wheeze,  shortness of breath;  CARDIOVASCULAR: no palpitation, no chest pain, shortness of breath;  GI: no abdominal pain, nausea, vomiting, no diarrhea,  constipation;  BERT: no dysuria, no hematuria, no frequency or urgency, no nephrolithiasis;  MUSCULOSKELETAL: no joint pain, no swelling, no stiffness; muscle cramping in bilateral feet  ENDOCRINE: no polyuria, no polydipsia, no cold or heat intolerance;  HEMATOLOGY: no easy bruising or bleeding, no history of clotting disorder;  DERMATOLOGY: no skin rash, no eczema, no pruritus;  PSYCHIATRY: no depression, no anxiety, no panic attacks, no suicidal ideation, no homicidal ideation;  NEUROLOGY: no syncope, no seizures, no numbness or tingling of hands, no numbness or tingling of feet, no paresis; tremors in bilateral hands    Objective   BP (!) 170/92   Pulse 56   Temp 97.9 °F (36.6 °C)   Ht 5' 5\" (1.651 m)   Wt 159 lb 9.6 oz (72.4 kg)   BMI 26.56 kg/m²     PHYSICAL EXAM:  CONSTITUTIONAL: Alert, appropriate, no acute distress  EYES: Non icteric, EOM intact, pupils equal round   ENT: Mucus membranes moist, no oral pharyngeal lesions, external inspection of ears and nose are normal  NECK: Supple, no masses. No palpable thyroid mass  CHEST/LUNGS: CTA bilaterally, normal respiratory effort   CARDIOVASCULAR: RRR, no murmurs. No lower extremity edema  ABDOMEN: soft non-tender, active bowel sounds, no HSM. No palpable masses  EXTREMITIES: warm, full ROM in all 4 extremities, no focal weakness. SKIN: warm, dry with no rashes or lesions  LYMPH: No cervical, clavicular, axillary, or inguinal lymphadenopathy  NEUROLOGIC: follows commands, non focal   PSYCH: mood and affect appropriate.   Alert and oriented to time, place, person      LABORATORY RESULTS REVIEWED/ANALYZED BY ME:  5/4/21 CEA 1.9  Lab Results   Component Value Date    WBC 3.74 (L) 06/07/2021    HGB 12.1 06/07/2021    HCT 36.4 06/07/2021    MCV 95.0 (H) 06/07/2021     06/07/2021     Lab Results   Component Value Date    NEUTROABS 1.19 (L) 06/07/2021     Lab Results   Component Value Date     06/07/2021    K 4.0 06/07/2021     06/07/2021    CO2 30 (H) 06/07/2021    BUN 16 06/07/2021    CREATININE 0.8 06/07/2021    GLUCOSE 98 06/07/2021    CALCIUM 8.6 06/07/2021    PROT 6.2 (L) 06/07/2021    LABALBU 3.6 06/07/2021    BILITOT 0.4 06/07/2021    ALKPHOS 115 (H) 06/07/2021    AST 27 06/07/2021    ALT 17 06/07/2021    LABGLOM >60 06/07/2021    GFRAA >59 05/18/2021    GLOB 2.7 06/07/2021     RADIOLOGY STUDIES REVIEWED BY ME:  Not found    ASSESSMENT:    No orders of the defined types were placed in this encounter. Diagnoses and all orders for this visit:    Malignant neoplasm of sigmoid colon ([facility] [address]    Chemotherapy-induced nausea    Chemotherapy management, encounter for    Adverse effect of chemotherapy, subsequent encounter    Care plan discussed with patient    Chemotherapy-induced neutropenia ([facility] [address]      Stage [id number] IIIC sigmoid colonic adenocarcinoma, grade 2, IHC MMR no loss of expression. Essentially, local advanced malignancy of the sigmoid colon. 18 lymph nodes involved. Discussed with family about adjuvant chemotherapy FOLFOX, logistics and side effects and management. I discussed this also with Dr. Digna White the referring physician. 5/4/2021started on adjuvant chemotherapy FOLFOX    Continue with Cycle #3 of FOLFOX today    Treatment-related toxicitynausea, vomiting, fatigue, shortness of breath with drinking beverages. Reinforced that patient should not have anything cold for 4-5 days after treatment. PLAN:  C#3/12 FOLFOX today  RTC with MD 4 weeks  CBC/CMP to assess toxicity  Continue taking Phenergan for nausea  Add Benadryl and Tylenol to premedications today    ISebastien, am scribing for Alfred Terry MD. Electronically signed by Sebastien Springer RN on 6/7/2021 at 11:39 AM CDT. I, Dr Gretchen Cheng, personally performed the services described in this documentation as scribed by Sebastien Springer RN in my presence and is both accurate and complete. I have seen, examined and reviewed this patient medication list, appropriate labs and imaging studies. I reviewed relevant medical records and others physicians notes. I discussed the plans of care with the patient. I answered all the questions to the patients satisfaction.  I have also reviewed the chief complaint (CC) and part of the history (History of Present Illness (HPI), Past Family Social History ([facility]), or Review of Systems (ROS) and made changes when appropriated.        (Please note that portions of this note were completed with a voice recognition program. Efforts were made to edit the dictations but occasionally words are mis-transcribed.)  Brett Calvin MD    06/07/21  3:41 PM

## 2021-06-07 NOTE — PROGRESS NOTES
PT's infusion completed at 1530. Pt resting in chair. PT c/o hand and feet tingling and drawing up. PT's vital signs obtained at this time. PT's /110. Dr. Celia Paget notified. New orders received for Clonidine 0.1mg PO x1 and Solumedrol 125mg IVP at this time. PT resting at this time. 1615: /92. PT still reports feeling tingling in hands. 1638:  /82. PT reports improvement in tingling in hands and overall condition. This RN walked PT outside to POV. PT's condition is stable at this time.

## 2021-06-07 NOTE — PROGRESS NOTES
Tylenol 1000mg PO and Benadryl 50mg IVP added to PT's treatment plan per VO Dr. Philomena Jones. PT will be kept for 1 hour after treatment to monitor for s/s of reaction.

## 2021-07-03 NOTE — PROGRESS NOTES
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 The patient had a CT chest performed that showed no evidence of metastatic disease. CEA was normal 3.7->1.9. She continues to smoke. She was started on chemotherapy with FOLFOX. She has received 2 cycles of adjuvant FOLFOX. She has tolerated treatments complains of fatigue, oral paresthesia, nausea and mild diarrhea. Denies any hair loss. She also has transient neuropathy that improves after 5 days. Cancer History:  Lisa Schreiber was first seen by me on 4/13/2021. She was referred by Dr. Julius Garvin. She has been diagnosed with sigmoid colonic adenocarcinoma. · 4/5/2021CT abdomen and pelvis with IV contrast showed multilevel spondylitic change, no evidence of liver metastasis. Area of marked circumferential narrowing with prominent small pericolonic nodes at the level of the mid sigmoid. · 4/5/21 Colonoscopy: Near obstructive lesion at 30cm. · 4/7/21 Peritoneal implants, biopsies: No malignancy identified. Nodular dystrophic calcification, histiocytes, and chronic inflammation. Sigmoid colon, partial colectomy: Mucinous adenocarcinoma. Maximum tumor at least 5.5 cm. Tumor extends through muscularis, and involves serosal surfaces. The proximal and distal surgical margins are free of tumor. Lymphovascular invasion is present.  18 of 20 lymph nodes are positive for metastatic carcinoma. Please see CAP synoptic. Anastomotic rings, excision: The surgical margins are viable and free of tumor. · 4/13/2001shtanika was first seen by me. Discussed results of pathology image studies. Recommended completion of staging with CT chest.  CEA postoperatively ordered today. She will likely be a candidate for adjuvant chemotherapy FOLFOX. · 4/16/21 CEA 3.7 DPD- negative   · 4/23/21- IHC MMR-no loss of expression  · 5/4/2001postoperative CEA 1.9  · 5/4/2021adjuvant FOLFOX  ·     Past Medical History:    Past Medical History:   Diagnosis Date    Colon cancer ([facility] [address] 04/05/2021    GERD (gastroesophageal reflux disease)     Hypertension     Palpitation        Past Surgical History:    Past Surgical History:   Procedure Laterality Date    BREAST LUMPECTOMY      CATARACT REMOVAL      COLONOSCOPY  04/05/2021    SUBTOTAL COLECTOMY  04/07/2021    TUBAL LIGATION         Social History:    Marital status: Marries  Smoking status: Currently smoker.   Quit 2 weeks ago  ETOH status: No  Resides: [facility], [place]    Family History:   Family History   Problem Relation Age of Onset    Heart Attack Mother     COPD Father     Heart Attack Father     Breast Cancer Maternal Aunt     Colon Cancer Maternal Uncle     Colon Cancer Paternal Cousin     Colon Cancer Maternal Uncle     Cancer Paternal Cousin         Lung Cancer       Current Hospital Medications:    Current Outpatient Medications   Medication Sig Dispense Refill    dilTIAZem (CARDIZEM CD) 120 MG extended release capsule Take 120 mg by mouth daily      promethazine (PHENERGAN) 12.5 MG tablet Take 1 tablet by mouth every 6 hours as needed for Nausea 60 tablet 0    ondansetron (ZOFRAN) 4 MG tablet Take 1 tablet by mouth every 6 hours as needed for Nausea or Vomiting 30 tablet 5    rosuvastatin (CRESTOR) 10 MG tablet Take 10 mg by mouth daily      losartan (COZAAR) 50 MG tablet Take 50 mg by mouth daily      aspirin 81 MG chewable tablet Take 81 mg by mouth daily      omeprazole (PRILOSEC) 20 MG capsule Take 20 mg by mouth daily      metoprolol succinate ER (TOPROL-XL) 25 MG XL tablet Take 25 mg by mouth daily       No current facility-administered medications for this visit. Allergies: No Known Allergies      Subjective   REVIEW OF SYSTEMS:   CONSTITUTIONAL: no fever, no night sweats, fatigue;  HEENT: no blurring of vision, no double vision, no hearing difficulty, no tinnitus, no ulceration, no dysplasia, no epistaxis;  LUNGS: no cough, no hemoptysis, no wheeze,  shortness of breath;  CARDIOVASCULAR: no palpitation, no chest pain, shortness of breath;  GI: no abdominal pain, nausea, vomiting, no diarrhea,  constipation;  BERT: no dysuria, no hematuria, no frequency or urgency, no nephrolithiasis;  MUSCULOSKELETAL: no joint pain, no swelling, no stiffness; muscle cramping in bilateral feet  ENDOCRINE: no polyuria, no polydipsia, no cold or heat intolerance;  HEMATOLOGY: no easy bruising or bleeding, no history of clotting disorder;  DERMATOLOGY: no skin rash, no eczema, no pruritus;  PSYCHIATRY: no depression, no anxiety, no panic attacks, no suicidal ideation, no homicidal ideation;  NEUROLOGY: no syncope, no seizures, numbness or tingling of hands, no numbness or tingling of feet, no paresis;     Objective   /75   Pulse (!) 46   Temp 98.2 °F (36.8 °C)   Resp 18   Ht 5' 5\" (1.651 m)   Wt 159 lb 1.6 oz (72.2 kg)   BMI 26.48 kg/m²     PHYSICAL EXAM:  CONSTITUTIONAL: Alert, appropriate, no acute distress  EYES: Non icteric, EOM intact, pupils equal round   ENT: Mucus membranes moist, no oral pharyngeal lesions, external inspection of ears and nose are normal  NECK: Supple, no masses. No palpable thyroid mass  CHEST/LUNGS: CTA bilaterally, normal respiratory effort   CARDIOVASCULAR: RRR, no murmurs. No lower extremity edema  ABDOMEN: soft non-tender, active bowel sounds, no HSM.   No palpable masses  EXTREMITIES: warm, full ROM in all 4 extremities, no focal weakness. SKIN: warm, dry with no rashes or lesions  LYMPH: No cervical, clavicular, axillary, or inguinal lymphadenopathy  NEUROLOGIC: follows commands, non focal   PSYCH: mood and affect appropriate. Alert and oriented to time, place, person      LABORATORY RESULTS REVIEWED/ANALYZED BY ME:  Lab Results   Component Value Date    WBC 6.96 07/07/2021    HGB 12.1 07/07/2021    HCT 36.1 07/07/2021    MCV 95.0 (H) 07/07/2021     07/07/2021     Lab Results   Component Value Date    NEUTROABS 4.50 07/07/2021     Lab Results   Component Value Date     07/07/2021    K 4.5 07/07/2021     07/07/2021    CO2 29 07/07/2021    BUN 20 (H) 07/07/2021    CREATININE 0.8 07/07/2021    GLUCOSE 81 07/07/2021    CALCIUM 9.3 07/07/2021    PROT 6.7 07/07/2021    LABALBU 4.0 07/07/2021    BILITOT 0.5 07/07/2021    ALKPHOS 117 (H) 07/07/2021    AST 65 (H) 07/07/2021    ALT 63 (H) 07/07/2021    LABGLOM >60 07/07/2021    GFRAA >59 05/18/2021    GLOB 2.6 07/07/2021         RADIOLOGY STUDIES REVIEWED BY ME:  Not found    ASSESSMENT:    Orders Placed This Encounter   Procedures    CBC Auto Differential     Standing Status:   Future     Number of Occurrences:   1     Standing Expiration Date:   7/7/2022    Comprehensive Metabolic Panel     Standing Status:   Future     Number of Occurrences:   1     Standing Expiration Date:   7/7/2022      Jacki Garcia was seen today for cancer. Diagnoses and all orders for this visit:    Malignant neoplasm of sigmoid colon ([facility] [address]  -     CBC Auto Differential; Future  -     Comprehensive Metabolic Panel; Future    Chemotherapy-induced nausea    Adverse effect of chemotherapy, subsequent encounter    Chemotherapy management, encounter for    Chemotherapy-induced peripheral neuropathy (HCC)      Stage [id number] IIIC sigmoid colonic adenocarcinoma, grade 2, IHC MMR no loss of expression. Essentially, local advanced malignancy of the sigmoid colon.   18 lymph nodes

## 2021-07-28 NOTE — PROGRESS NOTES
Treatment held today due to neutrophils of 0.73. To return next week for CBC evaluation and possible treatment. Folfox to be given every 3 weeks for remainder of treatments.

## 2021-08-21 NOTE — PROGRESS NOTES
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 She is currently receiving adjuvant FOLFOX. Unfortunately, treatment has been delayed on several occasions due to chemotherapy-induced neutropenia. Her treatment schedule was changed to every 3 weeks. However, she continues to have persistent neutropenia. At this point due to the fact this disease curative intent treatment we will add G-CSF to her treatment regimen. I reviewed CBC today that showed persistent neutropenia and therefore treatment will be delayed another week. She denies any new GI symptoms. She denies any bleeding. Denies any respiratory symptoms. She has mild intermittent cold neuropathy that resolves prior to her next treatment. Cancer History:  Maritza Heath was first seen by me on 4/13/2021. She was referred by Dr. Bonnie Alvarado. She has been diagnosed with sigmoid colonic adenocarcinoma. · 4/5/2021CT abdomen and pelvis with IV contrast showed multilevel spondylitic change, no evidence of liver metastasis. Area of marked circumferential narrowing with prominent small pericolonic nodes at the level of the mid sigmoid. · 4/5/21 Colonoscopy: Near obstructive lesion at 30cm. · 4/7/21 Peritoneal implants, biopsies: No malignancy identified.  Nodular dystrophic calcification, histiocytes, and chronic inflammation. Sigmoid colon, partial colectomy: Mucinous adenocarcinoma. Maximum tumor at least 5.5 cm. Tumor extends through muscularis, and involves serosal surfaces. The proximal and distal surgical margins are free of tumor. Lymphovascular invasion is present. 18 of 20 lymph nodes are positive for metastatic carcinoma. Please see CAP synoptic. Anastomotic rings, excision: The surgical margins are viable and free of tumor. · 4/13/2001mary was first seen by me. Discussed results of pathology image studies. Recommended completion of staging with CT chest.  CEA postoperatively ordered today. She will likely be a candidate for adjuvant chemotherapy FOLFOX. · 4/16/21 CEA 3.7 DPD- negative   · 4/23/21- IHC MMR-no loss of expression  · 5/4/2001postoperative CEA 1.9  · 5/4/2021adjuvant FOLFOX  · 6/15/21-Change FOLFOX frequency to every 21 days  · 08/25/21-CEA 2.5. We will add G-CSF to her next regimen due to persistent neutropenia and curative intent treatment. Past Medical History:    Past Medical History:   Diagnosis Date    Colon cancer ([address] 04/05/2021    GERD (gastroesophageal reflux disease)     Hypertension     Palpitation        Past Surgical History:    Past Surgical History:   Procedure Laterality Date    BREAST LUMPECTOMY      CATARACT REMOVAL      COLONOSCOPY  04/05/2021    SUBTOTAL COLECTOMY  04/07/2021    TUBAL LIGATION         Social History:    Marital status: Marries  Smoking status: Currently smoker.   Quit 2 weeks ago  ETOH status: No  Resides: [facility], [place]    Family History:   Family History   Problem Relation Age of Onset    Heart Attack Mother     COPD Father     Heart Attack Father     Breast Cancer Maternal Aunt     Colon Cancer Maternal Uncle     Colon Cancer Paternal Cousin     Colon Cancer Maternal Uncle     Cancer Paternal [id number] Medications:    Current Outpatient Medications   Medication Sig Dispense Refill    promethazine (PHENERGAN) 12.5 MG tablet Take 1 tablet by mouth every 6 hours as needed for Nausea 60 tablet 0    dilTIAZem (CARDIZEM CD) 120 MG extended release capsule Take 120 mg by mouth daily      rosuvastatin (CRESTOR) 10 MG tablet Take 10 mg by mouth daily      losartan (COZAAR) 50 MG tablet Take 50 mg by mouth daily      aspirin 81 MG chewable tablet Take 81 mg by mouth daily      omeprazole (PRILOSEC) 20 MG capsule Take 20 mg by mouth daily      metoprolol succinate ER (TOPROL-XL) 25 MG XL tablet Take 25 mg by mouth daily      ondansetron (ZOFRAN) 4 MG tablet Take 1 tablet by mouth every 6 hours as needed for Nausea or Vomiting (Patient not taking: Reported on 8/25/2021) 30 tablet 5     No current facility-administered medications for this visit.        Allergies: No Known Allergies      Subjective   REVIEW OF SYSTEMS:   CONSTITUTIONAL: no fever, no night sweats, fatigue;  HEENT: no blurring of vision, no double vision, no hearing difficulty, no tinnitus, no ulceration, no dysplasia, no epistaxis;  LUNGS: no cough, no hemoptysis, no wheeze, no shortness of breath;  CARDIOVASCULAR: no palpitation, no chest pain, no shortness of breath;  GI: no abdominal pain, no nausea, no vomiting, no diarrhea,  constipation;  BERT: no dysuria, no hematuria, no frequency or urgency, no nephrolithiasis;  MUSCULOSKELETAL: no joint pain, no swelling, no stiffness; muscle cramping in bilateral feet  ENDOCRINE: no polyuria, no polydipsia, no cold or heat intolerance;  HEMATOLOGY: no easy bruising or bleeding, no history of clotting disorder;  DERMATOLOGY: no skin rash, no eczema, no pruritus;  PSYCHIATRY: no depression, no anxiety, no panic attacks, no suicidal ideation, no homicidal ideation;  NEUROLOGY: no syncope, no seizures, numbness or tingling of hands, no numbness or tingling of feet, no paresis;     Objective   BP (!) 144/81   Pulse 52   Temp 98.3 °F (36.8 °C) (Oral)   Ht 5' 5\" (1.651 m)   Wt 162 lb 1.6 oz (73.5 Specific Question:   Reason for exam:     Answer:   f/u colon cancer to assess response following 4 months treatment    CT CHEST W CONTRAST     Pt to bring radiology disc     Standing Status:   Future     Standing Expiration Date:   10/25/2021     Scheduling Instructions:      sched in 6 weeks at [address]     Order Specific Question:   Reason for exam:     Answer:   f/u colon cancer to assess response followign 4 months treatment    CBC Auto Differential     Standing Status:   Future     Number of Occurrences:   1     Standing Expiration Date:   8/25/2022    Comprehensive Metabolic Panel     Standing Status:   Future     Number of Occurrences:   1     Standing Expiration Date:   8/25/2022      Prema Rader was seen today for colon cancer. Diagnoses and all orders for this visit:    Malignant neoplasm of sigmoid colon ([facility] [address]  -     CBC Auto Differential; Future  -     Comprehensive Metabolic Panel; Future  -     CT ABDOMEN PELVIS W IV CONTRAST Additional Contrast? Oral; Future  -     CT CHEST W CONTRAST; Future    Chemotherapy-induced peripheral neuropathy [facility])    Care plan discussed with patient    Chemotherapy-induced neutropenia ([facility] [address]    Mucinous adenocarcinoma of colon ([facility] [address]  -     CT ABDOMEN PELVIS W IV CONTRAST Additional Contrast? 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 Oral; Future  -     CT CHEST W CONTRAST; Future      Stage [id number] IIIC sigmoid colonic adenocarcinoma, grade 2, IHC MMR no loss of expression. Essentially, local advanced malignancy of the sigmoid colon. 18 lymph nodes involved. Discussed with family about adjuvant chemotherapy FOLFOX, logistics and side effects and management. I discussed this also with Dr. Adrienne Foster the referring physician.   5/4/2021started on adjuvant chemotherapy FOLFOX  Hold cycle #6 of FOLFOX today due to neutropenia grade 3  Patient is afebrile. Treatment will be every 3 weeks. Will add G-CSF  CEA 2.5 on 8/25/2021    Treatment-related toxicitynausea, vomiting, fatigue, shortness of breath with drinking beverages. Reinforced that patient should not have anything cold for 4-5 days after treatment. Chemotherapy-induced neutropeniasignificant neutropenia despite increase length of treatment. We will add G-CSF. PLAN:  · HOLD C#6/12 FOLFOX today and every 3 weeks  · Will add GCSF support to each treatment  · RTC with MD 4 weeks  · CBC/CMP to assess toxicity  · Continue taking Phenergan for nausea  · Continue Benadryl and Tylenol to premedications today  · CT scans on Oct 2021  · CBC CMP CEAtoday    I, Ventura aTfoya, am scribing for Laury Enriquez MD. Electronically signed by Ventura Tafoya RN on 8/25/2021 at 10:14 AM CDT. I, Dr Capri Carvalho, personally performed the services described in this documentation as scribed by Ventura Tafoya RN in my presence and is both accurate and complete. I have seen, examined and reviewed this patient medication list, appropriate labs and imaging studies. I reviewed relevant medical records and others physicians notes. I discussed the plans of care with the patient. I answered all the questions to the patients satisfaction. I have also reviewed the chief complaint (CC) and part of the history (History of Present Illness (HPI), Past Family Social History [facility]), or Review of Systems (ROS) and made changes when appropriated.        (Please note that portions of this note were completed with a voice recognition program. Efforts were made to edit the dictations but occasionally words are mis-transcribed.)  Laury Enriquez MD    08/25/21  10:09 AM

## 2021-09-20 NOTE — PROGRESS NOTES
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 She is currently receiving adjuvant mFOLFOX. Unfortunately, treatment has been delayed on several occasions due to chemotherapy-induced neutropenia. She received G-CSF support during last visit. o her treatment schedule was changed to every 3 weeks. She has persistent neutropenia. Therefore, will recommend addition of G-CSF. She has been tolerating treatments complains of cold neuropathy that last for 4 to 5 days. She also has mild lingering neuropathy on her feet that is tolerable. Cancer History:  Loraine Segundo was first seen by me on 4/13/2021. She was referred by Dr. Susana Domínguez. She has been diagnosed with sigmoid colonic adenocarcinoma. · 4/5/2021CT abdomen and pelvis with IV contrast showed multilevel spondylitic change, no evidence of liver metastasis. Area of marked circumferential narrowing with prominent small pericolonic nodes at the level of the mid sigmoid. · 4/5/21 Colonoscopy: Near obstructive lesion at 30cm. · 4/7/21 Peritoneal implants, biopsies: No malignancy identified. Nodular dystrophic calcification, histiocytes, and chronic inflammation. Sigmoid colon, partial colectomy: Mucinous adenocarcinoma. Maximum tumor at least 5.5 cm.  Tumor extends through muscularis, and involves serosal surfaces. The proximal and distal surgical margins are free of tumor. Lymphovascular invasion is present. 18 of 20 lymph nodes are positive for metastatic carcinoma. Please see CAP synoptic. Anastomotic rings, excision: The surgical margins are viable and free of tumor. · 4/13/2001mary was first seen by me. Discussed results of pathology image studies. Recommended completion of staging with CT chest.  CEA postoperatively ordered today. She will likely be a candidate for adjuvant chemotherapy FOLFOX. · 4/16/21 CEA 3.7 DPD- negative   · 4/23/21- IHC MMR-no loss of expression  · 5/4/2001postoperative CEA 1.9  · 5/4/2021adjuvant FOLFOX  · 6/15/21-Change FOLFOX frequency to every 21 days  · 08/25/21-CEA 2.5. We will add G-CSF to her next regimen due to persistent neutropenia and curative intent treatment. Past Medical History:    Past Medical History:   Diagnosis Date    Colon cancer [address] [address] 04/05/2021    GERD (gastroesophageal reflux disease)     Hypertension     Palpitation        Past Surgical History:    Past Surgical History:   Procedure Laterality Date    BREAST LUMPECTOMY      CATARACT REMOVAL      COLONOSCOPY  04/05/2021    SUBTOTAL COLECTOMY  04/07/2021    TUBAL LIGATION         Social History:    Marital status:   Smoking status: Currently smoker.   Quit 2 weeks ago  ETOH status: No  Resides: [place], [place]    Family History:   Family History   Problem Relation Age of Onset    Heart Attack Mother     COPD Father     Heart Attack Father     Breast Cancer Maternal Aunt     Colon Cancer Maternal Uncle     Colon Cancer Paternal Cousin     Colon Cancer Maternal Uncle     Cancer Paternal Cousin         Lung Cancer       Current Hospital Medications:    Current Outpatient Medications   Medication Sig Dispense Refill    promethazine (PHENERGAN) 12.5 MG tablet Take 1 tablet by mouth every 6 hours as needed for Nausea 60 tablet 0    dilTIAZem (CARDIZEM CD) 120 MG extended release capsule Take 120 mg by mouth daily      ondansetron (ZOFRAN) 4 MG tablet Take 1 tablet by mouth every 6 hours as needed for Nausea or Vomiting (Patient not taking: Reported on 8/25/2021) 30 tablet 5    rosuvastatin (CRESTOR) 10 MG tablet Take 10 mg by mouth daily      losartan (COZAAR) 50 MG tablet Take 50 mg by mouth daily      aspirin 81 MG chewable tablet Take 81 mg by mouth daily      omeprazole (PRILOSEC) 20 MG capsule Take 20 mg by mouth daily      metoprolol succinate ER (TOPROL-XL) 25 MG XL tablet Take 25 mg by mouth daily       No current facility-administered medications for this visit.      Facility-Administered Medications Ordered in Other Visits   Medication Dose Route Frequency Provider Last Rate Last Admin    dextrose 5 % solution   IntraVENous Once Keshia Henry MD        oxaliplatin (ELOXATIN) 150 mg in dextrose 5 % 250 mL chemo IVPB  150 mg IntraVENous Once Keshia Henry MD        leucovorin calcium (WELLCOVORIN) 700 mg in dextrose 5 % 250 mL IVPB  400 mg/m2 (Treatment Plan Recorded) IntraVENous Once Keshia Henry MD        fluorouracil (ADRUCIL) chemo syringe 725 mg  400 mg/m2 (Treatment Plan Recorded) IntraVENous Once Keshia Henry MD        fluorouracil (ADRUCIL) 4,350 mg in sodium chloride 0.9 % 250 mL chemo infusion  2,400 mg/m2 (Treatment Plan Recorded) IntraVENous Over 46 hours Keshia Henry MD        heparin flush 100 UNIT/ML injection 500 Units  500 Units IntraCATHeter PRN Keshia Henry MD        sodium chloride flush 0.9 % injection 5-40 mL  5-40 mL IntraVENous PRN Keshia Henry MD           Allergies: No Known Allergies      Subjective   REVIEW OF SYSTEMS:   CONSTITUTIONAL: no fever, no night sweats, fatigue;  HEENT: no blurring of vision, no double vision, no hearing difficulty, no tinnitus, no ulceration, no dysplasia, no epistaxis;  LUNGS: no cough, no hemoptysis, no wheeze, no shortness of breath;  CARDIOVASCULAR: no palpitation, no chest pain, no shortness of breath;  GI: no abdominal pain, no nausea, no vomiting, no diarrhea,  constipation;  BERT: no dysuria, no hematuria, no frequency or urgency, no nephrolithiasis;  MUSCULOSKELETAL: no joint pain, no swelling, no stiffness; muscle cramping in bilateral feet  ENDOCRINE: no polyuria, no polydipsia, no cold or heat intolerance;  HEMATOLOGY: no easy bruising or bleeding, no history of clotting disorder;  DERMATOLOGY: no skin rash, no eczema, no pruritus;  PSYCHIATRY: no depression, no anxiety, no panic attacks, no suicidal ideation, no homicidal ideation;  NEUROLOGY: no syncope, no seizures, numbness or tingling of hands, numbness or tingling of feet, no paresis;     Objective   BP (!) 149/80   Pulse 51   Temp 98.1 °F (36.7 °C)   Ht 5' 5\" (1.651 m)   Wt 164 lb 1.6 oz (74.4 kg)   SpO2 97%   BMI 27.31 kg/m²     PHYSICAL EXAM:  CONSTITUTIONAL: Alert, appropriate, no acute distress  EYES: Non icteric, EOM intact, pupils equal round   ENT: Mucus membranes moist, no oral pharyngeal lesions, external inspection of ears and nose are normal  NECK: Supple, no masses. No palpable thyroid mass  CHEST/LUNGS: CTA bilaterally, normal respiratory effort   CARDIOVASCULAR: RRR, no murmurs. No lower extremity edema  ABDOMEN: soft non-tender, active bowel sounds, no HSM. No palpable masses  EXTREMITIES: warm, full ROM in all 4 extremities, no focal weakness. SKIN: warm, dry with no rashes or lesions  LYMPH: No cervical, clavicular, axillary, or inguinal lymphadenopathy  NEUROLOGIC: follows commands, non focal   PSYCH: mood and affect appropriate.   Alert and oriented to time, place, person    LABORATORY RESULTS REVIEWED/ANALYZED BY ME:  Lab Results   Component Value Date    WBC 9.01 09/22/2021    HGB 11.7 09/22/2021    HCT 35.1 09/22/2021    MCV 99.4 (H) 09/22/2021     09/22/2021     Lab Results   Component Value Date    NEUTROABS 6.19 (H) 09/22/2021     Lab Results   Component Value Date     09/22/2021    K 4.8 09/22/2021     09/22/2021    CO2 27 09/22/2021    BUN 20 (H) 09/22/2021    CREATININE 0.7 09/22/2021    GLUCOSE 96 09/22/2021    CALCIUM 9.1 09/22/2021    PROT 6.9 09/22/2021    LABALBU 3.8 09/22/2021    BILITOT 0.8 09/22/2021    ALKPHOS 149 (H) 09/22/2021    AST 45 (H) 09/22/2021    ALT 33 09/22/2021    LABGLOM >60 09/22/2021    GFRAA >59 05/18/2021    GLOB 3.1 09/22/2021     RADIOLOGY STUDIES REVIEWED BY ME:  None    ASSESSMENT:    Orders Placed This Encounter   Procedures    VL DUP LOWER EXTREMITY VENOUS RIGHT     Standing Status:   Future     Standing Expiration Date:   9/22/2022     Scheduling Instructions:      sched today     Order Specific Question:   Reason for exam:     Answer:   New pain & swelling right leg    CBC Auto Differential     Standing Status:   Future     Number of Occurrences:   1     Standing Expiration Date:   9/22/2022    Comprehensive Metabolic Panel     Standing Status:   Future     Number of Occurrences:   1     Standing Expiration Date:   9/22/2022      Melinda Simental was seen today for colon cancer. Diagnoses and all orders for this visit:    Malignant neoplasm of sigmoid colon ([facility] [address]  -     CBC Auto Differential; Future  -     Comprehensive Metabolic Panel; Future    Chemotherapy management, encounter for    Adverse effect of chemotherapy, subsequent encounter    Care plan discussed with patient    Pain and swelling of right lower leg  -     VL DUP LOWER EXTREMITY VENOUS RIGHT; Future      Stage [id number] IIIC sigmoid colonic adenocarcinoma, grade 2, IHC MMR no loss of expression. Essentially, local advanced malignancy of the sigmoid colon. 18 lymph nodes involved. Discussed with family about adjuvant chemotherapy FOLFOX, logistics and side effects and management. I discussed this also with Dr. Paula Nelson the referring physician.   5/4/2021started on adjuvant chemotherapy FOLFOX  Hold cycle #6 of FOLFOX today due to neutropenia grade 3  Patient is afebrile. Treatment will be every 3 weeks. Will add G-CSF  CEA 2.5 on 8/25/2021. Proceed cycle #7 with G-CSF support. Will change schedule to every 2 weeks. Treatment-related toxicitynausea, vomiting, fatigue, shortness of breath with drinking beverages. Reinforced that patient should not have anything cold for 4-5 days after treatment. Chemotherapy-induced neutropeniashe is on G-CSF. PLAN:  · Proceed c#7/12 FOLFOX today and every 2 weeks  · GCSF support to each treatment  · RTC with MD 2 weeks  · CBC/CMP to assess toxicity  · Continue taking Phenergan for nausea  · Continue Benadryl and Tylenol to premedications today  · CT scans in Oct 2021      IMariaelena, am scribing for Jake Delgado MD. Electronically signed by Mariaelena Rowell RN on 9/22/2021 at 10:14 AM CDT. I, Dr Lorenda Lennox, personally performed the services described in this documentation as scribed by Mariaelena Rowell RN in my presence and is both accurate and complete. I have seen, examined and reviewed this patient medication list, appropriate labs and imaging studies. I reviewed relevant medical records and others physicians notes. I discussed the plans of care with the patient. I answered all the questions to the patients satisfaction. I have also reviewed the chief complaint (CC) and part of the history (History of Present Illness (HPI), Past Family Social History [facility]), or Review of Systems (ROS) and made changes when appropriated.        (Please note that portions of this note were completed with a voice recognition program. Efforts were made to edit the dictations but occasionally words are mis-transcribed.)  Jake Delgado MD    09/22/21  9:29 AM

## 2021-09-23 NOTE — TELEPHONE ENCOUNTER
Spoke with Tiny PEREIRA regarding venous US. I explained that per Dr. Ernestine Canada she will start eliquis for six weeks.

## 2021-10-04 NOTE — PROGRESS NOTES
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 She is currently receiving adjuvant mFOLFOX. Unfortunately, treatment has been delayed on several occasions due to chemotherapy-induced neutropenia. She had CT chest abdomen pelvis performed this morning that showed new onset of lymphadenopathy in the mesentery concerning for progressive disease. Cancer History:  Nestor Nazario was first seen by me on 4/13/2021. She was referred by Dr. Sean Fraga. She has been diagnosed with sigmoid colonic adenocarcinoma. · 4/5/2021CT abdomen and pelvis with IV contrast showed multilevel spondylitic change, no evidence of liver metastasis. Area of marked circumferential narrowing with prominent small pericolonic nodes at the level of the mid sigmoid. · 4/5/21 Colonoscopy: Near obstructive lesion at 30cm. · 4/7/21 Peritoneal implants, biopsies: No malignancy identified. Nodular dystrophic calcification, histiocytes, and chronic inflammation. Sigmoid colon, partial colectomy: Mucinous adenocarcinoma. Maximum tumor at least 5.5 cm. Tumor extends through muscularis, and involves serosal surfaces. The proximal and distal surgical margins are free of tumor.  Lymphovascular invasion is present. 18 of 20 lymph nodes are positive for metastatic carcinoma. Please see CAP synoptic. Anastomotic rings, excision: The surgical margins are viable and free of tumor. · 4/13/2021she was first seen by me. Discussed results of pathology image studies. Recommended completion of staging with CT chest.  CEA postoperatively ordered today. She will likely be a candidate for adjuvant chemotherapy FOLFOX. · 4/16/21 CEA 3.7 DPD- negative   · 4/23/21- IHC MMR-no loss of expression  · 5/4/2001postoperative CEA 1.9  · 5/4/2021adjuvant FOLFOX  · 6/15/21-Change FOLFOX frequency to every 21 days  · 08/25/21-CEA 2.5. We will add G-CSF to her next regimen due to persistent neutropenia and curative intent treatment. · 10/06/2021 CT Chest w/ Contrast No acute abnormality or neoplastic disease is seen within the chest.  · 10/06/2021 CT Abd/Pelvis w/ IV Contrast (Oral) No mass lesion is seen within the abdomen or pelvis. New finding of at least 12 mesenteric and omental lymph nodes with the largest of these measuring 14 x 9 mm. · 10/6/2021imaging studies showed progressive disease. Recommended FOLFIRI. We will add a biologic agent after K-precious mutation status. · 10/6/2021CEA 3.4  · 10/6/2021[facility]. Discussed with Dr. Nicole Sánchez who will assess the patient for consideration of further resection/HIPEC    Past Medical History:    Past Medical History:   Diagnosis Date    Colon cancer ([address] 04/05/2021    GERD (gastroesophageal reflux disease)     Hypertension     Palpitation        Past Surgical History:    Past Surgical History:   Procedure Laterality Date    BREAST LUMPECTOMY      CATARACT REMOVAL      COLONOSCOPY  04/05/2021    SUBTOTAL COLECTOMY  04/07/2021    TUBAL LIGATION         Social History:    Marital status:   Smoking status: Currently smoker.   Quit 2 weeks ago  ETOH status: No  Resides: [place], [place]    Family History:   Family History   Problem Relation Age of Onset    Heart Attack Mother     COPD Father     Heart Attack Father     Breast Cancer Maternal Aunt     Colon Cancer Maternal Uncle     Colon Cancer Paternal Cousin     Colon Cancer Maternal Uncle     Cancer Paternal Cousin         Lung Cancer       Current Hospital Medications:    Current Outpatient Medications   Medication Sig Dispense Refill    apixaban starter pack (ELIQUIS DVT/PE STARTER PACK) 5 MG TBPK tablet Take 1 tablet by mouth See Admin Instructions 74 tablet 0    promethazine (PHENERGAN) 12.5 MG tablet Take 1 tablet by mouth every 6 hours as needed for Nausea 60 tablet 0    dilTIAZem (CARDIZEM CD) 120 MG extended release capsule Take 120 mg by mouth daily      ondansetron (ZOFRAN) 4 MG tablet Take 1 tablet by mouth every 6 hours as needed for Nausea or Vomiting (Patient not taking: Reported on 8/25/2021) 30 tablet 5    rosuvastatin (CRESTOR) 10 MG tablet Take 10 mg by mouth daily      losartan (COZAAR) 50 MG tablet Take 50 mg by mouth daily      aspirin 81 MG chewable tablet Take 81 mg by mouth daily      omeprazole (PRILOSEC) 20 MG capsule Take 20 mg by mouth daily      metoprolol succinate ER (TOPROL-XL) 25 MG XL tablet Take 25 mg by mouth daily       No current facility-administered medications for this visit.      Facility-Administered Medications Ordered in Other Visits   Medication Dose Route Frequency Provider Last Rate Last Admin    sodium chloride flush 0.9 % injection 5-40 mL  5-40 mL IntraVENous PRN Florence Jett MD        heparin flush 100 UNIT/ML injection 500 Units  500 Units IntraCATHeter PRN Florence Jett MD           Allergies: No Known Allergies      Subjective   REVIEW OF SYSTEMS:   CONSTITUTIONAL: no fever, no night sweats,  fatigue;  HEENT: no blurring of vision, no double vision, no hearing difficulty, no tinnitus, no ulceration, no dysplasia, no epistaxis;   LUNGS: no cough, no hemoptysis, no wheeze,  no shortness of breath;  CARDIOVASCULAR: no palpitation, no chest pain, no shortness of breath;  GI: no abdominal pain, no nausea, no vomiting, no diarrhea, no constipation;  BERT: no dysuria, no hematuria, no frequency or urgency, no nephrolithiasis;  MUSCULOSKELETAL: no joint pain, no swelling, no stiffness;  ENDOCRINE: no polyuria, no polydipsia, no cold or heat intolerance;  HEMATOLOGY: no easy bruising or bleeding, no history of clotting disorder;  DERMATOLOGY: no skin rash, no eczema, no pruritus;  PSYCHIATRY: no depression, no anxiety, no panic attacks, no suicidal ideation, no homicidal ideation;  NEUROLOGY: no syncope, no seizures, no numbness or tingling of hands,  numbness or tingling of feet, no paresis;      Objective   BP (!) 182/88   Pulse 58   Temp 98 °F (36.7 °C)   Ht 5' 5\" (1.651 m)   Wt 163 lb 14.4 oz (74.3 kg)   SpO2 98%   BMI 27.27 kg/m²     PHYSICAL EXAM:  CONSTITUTIONAL: Alert, appropriate, no acute distress  EYES: Non icteric, EOM intact, pupils equal round   ENT: Mucus membranes moist, no oral pharyngeal lesions, external inspection of ears and nose are normal  NECK: Supple, no masses. No palpable thyroid mass  CHEST/LUNGS: CTA bilaterally, normal respiratory effort   CARDIOVASCULAR: RRR, no murmurs. No lower extremity edema  ABDOMEN: soft non-tender, active bowel sounds, no HSM. No palpable masses  EXTREMITIES: warm, full ROM in all 4 extremities, no focal weakness. SKIN: warm, dry with no rashes or lesions  LYMPH: No cervical, clavicular, axillary, or inguinal lymphadenopathy  NEUROLOGIC: follows commands, non focal   PSYCH: mood and affect appropriate.   Alert and oriented to time, place, person    LABORATORY RESULTS REVIEWED/ANALYZED BY ME:    Lab Results   Component Value Date     10/06/2021    K 4.4 10/06/2021     10/06/2021    CO2 28 10/06/2021    BUN 15 10/06/2021    CREATININE 0.8 10/06/2021    GLUCOSE 96 10/06/2021    CALCIUM 8.8 10/06/2021    PROT 6.4 10/06/2021    LABALBU 3.9 10/06/2021    BILITOT 0.7 10/06/2021    ALKPHOS 178 (H) 10/06/2021    AST 38 (H) 10/06/2021    ALT 24 10/06/2021    LABGLOM >60 10/06/2021    GFRAA >59 05/18/2021    GLOB 2.5 10/06/2021         RADIOLOGY STUDIES REVIEWED BY ME:  10/06/2021 CT Chest w/ Contrast No acute abnormality or neoplastic disease is seen within the chest.    10/06/2021 CT Abd/Pelvis w/ IV Contrast (Oral) No mass lesion is seen within the abdomen or pelvis. Scattered mildly prominent mesenteric and omental lymph nodes compatible with new metastatic lymph node disease. ASSESSMENT:    Orders Placed This Encounter   Procedures    CBC Auto Differential     Standing Status:   Future     Number of Occurrences:   1     Standing Expiration Date:   10/6/2022    Comprehensive Metabolic Panel     Standing Status:   Future     Number of Occurrences:   1     Standing Expiration Date:   10/6/2022    CEA     Standing Status:   Future     Standing Expiration Date:   10/6/2022      Cristian Moscoso was seen today for colon cancer. Diagnoses and all orders for this visit:    Malignant neoplasm of sigmoid colon ([facility] [address]  -     CBC Auto Differential; Future  -     Comprehensive Metabolic Panel; Future  -     CEA; Future    Chemotherapy management, encounter for    Adverse effect of chemotherapy, subsequent encounter    Care plan discussed with patient    Metastatic adenocarcinoma to lymph node ([address]      Stage [id number] IIIC sigmoid colonic adenocarcinoma, grade 2, IHC MMR no loss of expression. Essentially, local advanced malignancy of the sigmoid colon. 18 lymph nodes involved. Discussed with family about adjuvant chemotherapy FOLFOX, logistics and side effects and management. I discussed this also with Dr. Yrn Gonzalez the referring physician. 5/4/2021started on adjuvant chemotherapy FOLFOX  Hold cycle #6 of FOLFOX today due to neutropenia grade 3  Patient is afebrile. Treatment will be every 3 weeks. Will add G-CSF  CEA 2.5 on 8/25/2021.    CT chest/abdomen pelvis 10/05/21-new onset of mesenteric adenopathy concerning for disease progression. We will request NGS colon cancer profile  We will change treatment to FOLFIRI. We will add a biologic agent after K-precious mutation status. 10/6/2021CEA 3.4    Treatment-related toxicitynausea, vomiting, fatigue, shortness of breath with drinking beverages. Reinforced that patient should not have anything cold for 4-5 days after treatment. Coordination of complex medical carediscussed Dr. Tutu Turner, colorectal surgeon at [facility]. She will be seen the patient for further assessment of surgery/HIPEC evaluation. PLAN:  · Recommend NGS studies on [facility] pathology  · Recommend changing treatment to FOLFIRI-start 1 week  · RTC with MD 3 weeks  · CBC/CMP/CEA today to assess toxicity  · Continue Phenergan for nausea  · Continue Benadryl and Tylenol to premedications today  · NGS colon cancer profile   · Will discuss with colorectal surgery. Lety Jimenez am scribing for Felecia Collazo MD. Electronically signed by Mei Benito RN on 10/6/2021 at 10:16 AM CDT. I, Dr Burgess Madrigal, personally performed the services described in this documentation as scribed by Mei Benito RN in my presence and is both accurate and complete. I have seen, examined and reviewed this patient medication list, appropriate labs and imaging studies. I reviewed relevant medical records and others physicians notes. I discussed the plans of care with the patient. I answered all the questions to the patients satisfaction. I have also reviewed the chief complaint (CC) and part of the history (History of Present Illness (HPI), Past Family Social History [facility]), or Review of Systems (ROS) and made changes when appropriated.        (Please note that portions of this note were completed with a voice recognition program. Efforts were made to edit the dictations but occasionally words are mis-transcribed.)  Felecia Collazo MD    10/06/21  10:19 AM

## 2021-10-06 PROBLEM — C77.9 METASTATIC ADENOCARCINOMA TO LYMPH NODE (HCC): Status: ACTIVE | Noted: 2021-01-01

## 2021-10-08 NOTE — TELEPHONE ENCOUNTER
LEFT  with appointment information and for patient to call back and verify the appointment     Dr. Miroslava Moreno   October 15th 2021 @ 9:15am   [address] [place] [address]   Ph: [phone] Fax: [phone]

## 2021-10-12 NOTE — PROGRESS NOTES
Treatment held today per Dr. Ria Severin due to Mrs. Moreno going to see doctor in [place] on Friday. She is scheduled to have a virtual visit next Tuesday with Dr. Ria Severin to discuss appointment and plan future treatments.

## 2021-10-25 NOTE — TELEPHONE ENCOUNTER
Call to pt to see if she has heard from [facility]/Dr. Navid Youssef. Per pt, she hasn't heard anything. Dr. Olga Sousa called Dr. Tutu Turner who stated she will be presenting pt case to tumor board tomorrow at [facility] and she will call pt with decision. Both MDs decided to hold treatment this week until decision can be made. This week appts cancelled.

## 2021-12-03 PROBLEM — I10 PRIMARY HYPERTENSION: Status: ACTIVE | Noted: 2021-01-01

## 2021-12-03 PROBLEM — I44.1 2ND DEGREE ATRIOVENTRICULAR BLOCK: Status: ACTIVE | Noted: 2021-01-01

## 2021-12-03 PROBLEM — I82.431 ACUTE DEEP VEIN THROMBOSIS (DVT) OF POPLITEAL VEIN OF RIGHT LOWER EXTREMITY (HCC): Status: ACTIVE | Noted: 2021-01-01

## 2021-12-03 PROBLEM — E78.2 MIXED HYPERLIPIDEMIA: Status: ACTIVE | Noted: 2021-01-01

## 2021-12-03 NOTE — PROGRESS NOTES
Referring Provider: Tee Meyer MD    Reason for Consultation: 2nd degree heart block    Chief complaint:   Chief Complaint   Patient presents with   • New Patient     referred by Dr. Myrick for evaluation of an abnormal stress test done at .   • Fatigue     pt states that she thought this was from the Chemo.  she started having pain in the shoulders.  she had a stress test at [facility] that was abnormal so she was referred here.   • Hypertension     pt states she takes bp medication.  it runs good at home.       Subjective .     History of present illness:  Alicja Beltrán is a 76 y.o. yo female who presents today for evaluation of heart block that occurred shortly after her lexiscan sestamibi earlier this week. She admits to   Chief Complaint   Patient presents with   • New Patient     referred by Dr. Myrick for evaluation of an abnormal stress test done at .   • Fatigue     pt states that she thought this was from the Chemo.  she started having pain in the shoulders.  she had a stress test at [facility] that was abnormal so she was referred here.   • Hypertension     pt states she takes bp medication.  it runs good at home.   .     History  Past Medical History:   Diagnosis Date   • Arrhythmia     fluttering when on chemo   • Cancer (HCC)     colon   • Hyperlipidemia    • Hypertension    ,   Past Surgical History:   Procedure Laterality Date   • BREAST BIOPSY Right 1975    benign   • CATARACT EXTRACTION     • COLON BIOPSY     • COLON SURGERY     • COLONOSCOPY     ,   Family History   Problem Relation Age of Onset   • Heart disease Mother    • Heart attack Mother    • COPD Father    • Heart attack Father    • Heart disease Sister    • Lung disease Brother    • No Known Problems Daughter    • No Known Problems Son    • No Known Problems Maternal Grandmother    • No Known Problems Paternal Grandmother    • Breast cancer Maternal Aunt    • No Known Problems Paternal Aunt    • BRCA 1/2 Neg Hx    •  "Colon cancer Neg Hx    • Endometrial cancer Neg Hx    • Ovarian cancer Neg Hx    ,   Social History     Tobacco Use   • Smoking status: Former Smoker     Quit date: 2020     Years since quittin.9   • Smokeless tobacco: Never Used   Vaping Use   • Vaping Use: Never used   Substance Use Topics   • Alcohol use: Not Currently   • Drug use: Never   ,     Medications  Current Outpatient Medications   Medication Sig Dispense Refill   • aspirin 81 MG chewable tablet Chew 81 mg Daily.     • dilTIAZem CD (CARDIZEM CD) 120 MG 24 hr capsule Take 120 mg by mouth Daily.     • losartan (COZAAR) 100 MG tablet Take 100 mg by mouth Daily.     • metoprolol tartrate (LOPRESSOR) 50 MG tablet 2 (Two) Times a Day.     • omeprazole (priLOSEC) 20 MG capsule Daily.     • rosuvastatin (CRESTOR) 10 MG tablet Take 10 mg by mouth Daily.       No current facility-administered medications for this visit.        Allergies:  Hydrocodone    Review of Systems  Review of Systems   HENT: Negative for nosebleeds.    Cardiovascular: Negative for chest pain, claudication, dyspnea on exertion, leg swelling, near-syncope, orthopnea, palpitations, paroxysmal nocturnal dyspnea and syncope.   Respiratory: Negative for hemoptysis and shortness of breath.    Musculoskeletal: Positive for joint pain.   Gastrointestinal: Negative for melena.   Genitourinary: Negative for hematuria.   Neurological: Positive for dizziness and paresthesias.       Objective     Physical Exam:  /80   Pulse 74   Ht 165.1 cm (65\")   Wt 73 kg (161 lb)   LMP  (LMP Unknown)   SpO2 100%   BMI 26.79 kg/m²   Pulmonary:      Effort: Pulmonary effort is normal.      Breath sounds: Normal breath sounds.   Cardiovascular:      Normal rate. Regular rhythm.      Murmurs: There is no murmur.   Edema:     Pretibial: bilateral trace edema of the pretibial area.     Ankle: bilateral trace edema of the ankle.        Results Review:   I reviewed the patient's new clinical results.    ECG 12 " Lead    Date/Time: 12/3/2021 11:42 AM  Performed by: Matt Way MD  Authorized by: Matt Way MD   Previous ECG: no previous ECG available  Rhythm: sinus bradycardia  Rate: normal  Conduction: conduction normal  ST Segments: ST segments normal  QRS axis: normal    Clinical impression: normal ECG            Lab Requisition on 04/07/2021   Component Date Value Ref Range Status   • Case Report 04/07/2021    Final                    Value:Surgical Pathology Report                         Case: [id number]                                  Authorizing Provider:  David Tellez MD  Collected:           04/07/2021 09:28 AM          Ordering Location:     [facility]     Received:            04/08/2021 06:16 AM                                 LABORATORY                                                                   Pathologist:           Wilber Zamorano MD                                                      Specimens:   1) - Peritoneum, Peritoneal implants                                                                2) - Colon, sigmoid colon                                                                           3) - Colon, Anastamosis rings                                                             • Clinical Information 04/07/2021    Final                    Value:This result contains rich text formatting which cannot be displayed here.   • Final Diagnosis 04/07/2021    Final                    Value:This result contains rich text formatting which cannot be displayed here.   • Synoptic Checklist 04/07/2021    Corrected                    Value:COLON AND RECTUM: Resection, Including Transanal Disk Excision of Rectal Neoplasms                            COLON AND RECTUM: RESECTION - All Specimens                            8th Edition - Protocol posted: 2/26/2020                                                        SPECIMEN                               Procedure:    Sigmoidectomy                                                          TUMOR                               Tumor Site:    Sigmoid colon                                Histologic Type:    Mucinous adenocarcinoma                                Histologic Grade:    G2: Moderately differentiated                                Tumor Size:    Greatest dimension (Centimeters): 5.5 cm                               Tumor Extension:    Tumor invades the visceral peritoneum: Tumor and mucin are involved with the serasal surfaces.                                Macroscopic Tumor Perforation:    Present                                Lymphovascular Invasion:    Present                                Perineural Invasion:    Not identified                                Treatment Effect:    No known presurgical therapy                                                         MARGINS                               Margins:                                     Proximal Margin:    Uninvolved by invasive carcinoma, high grade dysplasia / intramucosal carcinoma, and low grade dysplasia                                  Distal Margin:    Uninvolved by invasive carcinoma, high grade dysplasia / intramucosal carcinoma, and low grade dysplasia                                  Radial (circumferential) or Mesenteric Margin:    Involved by invasive carcinoma (tumor present 0-1 mm from margin)                                                         LYMPH NODES                               Number of Lymph Nodes Involved:    18                                Number of Lymph Nodes Examined:    20                                Tumor Deposits:    Present                                  Number of Deposits:    Cannot be determined: too many to count,confluent tumor and may represent completely effaced lymphnodes.                                                         PATHOLOGIC STAGE CLASSIFICATION (pTNM, AJCC 8th Edition)                                                               Primary Tumor (pT):    pT4a                                Regional Lymph Nodes (pN):    pN2b                                                         ADDITIONAL FINDINGS                               Additional Findings:    None identified      • Intraoperative Consultation 04/07/2021    Final                    Value:This result contains rich text formatting which cannot be displayed here.   • Gross Description 04/07/2021    Final                    Value:This result contains rich text formatting which cannot be displayed here.       Assessment/Plan   Problem List Items Addressed This Visit        Cardiac and Vasculature    Mixed hyperlipidemia - Primary    Current Assessment & Plan     Adequate response to statin         Relevant Medications    rosuvastatin (CRESTOR) 10 MG tablet    Primary hypertension    Current Assessment & Plan     Adequate control         Relevant Medications    losartan (COZAAR) 100 MG tablet    dilTIAZem CD (CARDIZEM CD) 120 MG 24 hr capsule    metoprolol tartrate (LOPRESSOR) 50 MG tablet          Medical Complexity  must have 2 out of 3     Moderate Complexity Level 4           1 of the following medical problems:          []One chronic illness with mild exacerbation         [x]Two or more stable chronic illness          [x]One new problem  []One acute illness with systemic symptoms    Complexity of Data  Reviewed (1 out of the 3 following categories)      Category 1 tests, documents, historian (must have 3 points)       [x]Review of prior external records  [x]Review of results of unique tests  [x]Ordering unique tests  []Assessment requires an independent historian    Category 2 Interpretation of tests   []Independent interpretation of test read by another doc    Category 3 Discuss Management/tests  [x]Discussion with external physician    Risk of complications and/or morbidity          [x]   Prescription Drug Management

## 2021-12-03 NOTE — ASSESSMENT & PLAN NOTE
Probably due to the regadenoson used as a vasodilator for her stress test especially her taking a beta blocker an Ca blocker. However she does have some dizzy spells. I will get a 7 day event monitor and if no evidence of recurrent heart block she can proceed with surgery as planned

## 2021-12-10 NOTE — TELEPHONE ENCOUNTER
Spoke to patient regarding the status of surgery. She mentioned results of her stress test being low risk, and she is waiting results of CT scans to determine next step. Cancelled surgery appointment on 12/7.

## 2021-12-14 NOTE — PROGRESS NOTES
Addendum:  Patient had progressive disease October 2021. She was seen by the [facility] surgical oncology group with plans for debulking surgery/HIPEC. EKG showed abnormalities. She was sent to cardiology for clearance. Unfortunately, she had more clinical progression. She was hospitalized [facility] with ascites. Discussed with Dr. Kole Villanueva. Ascites/drainage. We will plan to start chemotherapy.   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 Rebecca Mane, surgical oncology [place]

## 2021-12-22 NOTE — TELEPHONE ENCOUNTER
----- Message from Matt Way MD sent at 12/16/2021 11:14 AM CST -----  Several short runs of PSVT. Refer to EP        Called pt to give results.  Spoke with her .  Pt is in the hospital at [place].  Pts  said her cancer has spread and they were only giving her days to live.  He said if she pulls through this and is able to see the EP he will call back to make the appt, but he feels that she will not make it in the next few days.  He said the heart was the least of their problems right now.  Justin Torres, CMA

## 2021-12-28 PROBLEM — C18.9 COLON CANCER (HCC): Status: ACTIVE | Noted: 2021-01-01

## 2022-01-10 ENCOUNTER — APPOINTMENT (OUTPATIENT)
Dept: MAMMOGRAPHY | Facility: HOSPITAL | Age: 77
End: 2022-01-10